# Patient Record
Sex: MALE | Employment: FULL TIME | URBAN - METROPOLITAN AREA
[De-identification: names, ages, dates, MRNs, and addresses within clinical notes are randomized per-mention and may not be internally consistent; named-entity substitution may affect disease eponyms.]

---

## 2017-03-01 ENCOUNTER — OFFICE VISIT (OUTPATIENT)
Dept: FAMILY MEDICINE | Facility: CLINIC | Age: 22
End: 2017-03-01
Payer: COMMERCIAL

## 2017-03-01 VITALS
HEIGHT: 69 IN | TEMPERATURE: 97.7 F | DIASTOLIC BLOOD PRESSURE: 79 MMHG | HEART RATE: 70 BPM | BODY MASS INDEX: 34.66 KG/M2 | WEIGHT: 234 LBS | SYSTOLIC BLOOD PRESSURE: 127 MMHG

## 2017-03-01 DIAGNOSIS — F17.200 TOBACCO USE DISORDER: Primary | ICD-10-CM

## 2017-03-01 PROCEDURE — 99213 OFFICE O/P EST LOW 20 MIN: CPT | Performed by: FAMILY MEDICINE

## 2017-03-01 NOTE — NURSING NOTE
"Chief Complaint   Patient presents with     Patient Request     quit smoking discusssion        Initial /79 (BP Location: Right arm, Patient Position: Chair, Cuff Size: Adult Large)  Pulse 70  Temp 97.7  F (36.5  C) (Oral)  Ht 5' 8.5\" (1.74 m)  Wt 234 lb (106.1 kg)  BMI 35.06 kg/m2 Estimated body mass index is 35.06 kg/(m^2) as calculated from the following:    Height as of this encounter: 5' 8.5\" (1.74 m).    Weight as of this encounter: 234 lb (106.1 kg).  Medication Reconciliation: complete  Neyda Simms MA    "

## 2017-03-01 NOTE — MR AVS SNAPSHOT
After Visit Summary   3/1/2017    Juan Lay    MRN: [id number]           Patient Information     Date Of Birth          1995        Visit Information        Provider Department      3/1/2017 8:00 AM Tatyana Valdovinos MD [facility]        Today's Diagnoses     Tobacco use disorder    -  1       Follow-ups after your visit        Additional Services     CALL IT QUITS (QUITPLAN) REFERRAL       [place] TOBACCO QUITLINES FAX FORM  Fax form to: [phone]    The clinic will facilitate the referral to the quitline.    Provider Information:  ===============================================================  Tatyana Valdovinos MD  ID#: [id number] - FMG: [facility] [phone] Fax: [phone]   [email or web address]  Payor: SELECTCARE / Plan: AETNA SELECTCARE / Product Type: HMO /   ===============================================================    The Public Health Service Guideline does not recommend providing over-the-counter nicotine replacement therapy products without physician authorization to patients with the following conditions: pregnancy, uncontrolled high blood pressure, or cardiovascular diseases.     I authorize the [place] Tobacco Quitlines to provide over-the-counter nicotine replacement products for the patient listed below if the patient's health plan benefits cover NRT or if the patient is eligible for QUITPLAN services.    Patient Consented to:  ===============================================================  - YES - I am ready to quit tobacco and request the above information be given to the quitline so they may contact me.  I understand that one of [place] s Tobacco Quitlines will inform my provider about my participation.  ===============================================================  Please check the BEST 3-hour call window for them to reach you: 2pm -  "5pm  May we leave a message?  YES  Language Preference:  English  Phone Number: Home Phone      [phone]  Mobile          Not on file.     E-mail Address: No e-mail address on record    ========================================================================  FOR QUITLINE USE ONLY:  THIS INFORMATION WILL BE PROVIDED BACK TO THE PROVIDER  Contact date: __/ __/__ or ____ Did not reach after three attempts.    Outcome:  __ Enrolled in telephone counseling program  __ Declined  __ Not Reached    Stage of readiness: _______________________  Planned Quit Date: ___/ ___/ ___  Comments:      2011 [facility]   This message funded by [employer] [place], an independent licensee of the Blue Cross and [employer] Association. Rev. 11/1/12                  Who to contact     If you have questions or need follow up information about today's clinic visit or your schedule please contact [facility] directly at [phone].  Normal or non-critical lab and imaging results will be communicated to you by MyChart, letter or phone within 4 business days after the clinic has received the results. If you do not hear from us within 7 days, please contact the clinic through MyChart or phone. If you have a critical or abnormal lab result, we will notify you by phone as soon as possible.  Submit refill requests through [employer] or call your pharmacy and they will forward the refill request to us. Please allow 3 business days for your refill to be completed.          Additional Information About Your Visit        [employer] Information     [employer] lets you send messages to your doctor, view your test results, renew your prescriptions, schedule appointments and more. To sign up, go to [email or web address] . Click on \"Log in\" on the left side of the screen, which will take you to the Welcome page. Then click on \"Sign up Now\" on the right side of the page.     You will be asked to enter " "the access code listed below, as well as some personal information. Please follow the directions to create your username and password.     Your access code is: [email or web address]  Expires: 2017  8:45 AM     Your access code will  in 90 days. If you need help or a new code, please call your [facility] or [phone].        Care EveryWhere ID     This is your Care EveryWhere ID. This could be used by other organizations to access your [place] medical records  [id number]        Your Vitals Were     Pulse Temperature Height BMI (Body Mass Index)          70 97.7  F (36.5  C) (Oral) 5' 8.5\" (1.74 m) 35.06 kg/m2         Blood Pressure from Last 3 Encounters:   17 127/79   11/24/15 144/86   14 118/70    Weight from Last 3 Encounters:   17 234 lb (106.1 kg)   11/24/15 222 lb (100.7 kg) (97 %)*   14 212 lb (96.2 kg) (96 %)*     * Growth percentiles are based on [facility] 2-20 Years data.              We Performed the Following     CALL IT QUITS (QUITPLAN) REFERRAL          Today's Medication Changes          These changes are accurate as of: 3/1/17  8:45 AM.  If you have any questions, ask your nurse or doctor.               Start taking these medicines.        Dose/Directions    nicotine polacrilex 2 MG gum   Commonly known as:  [employer] NICOTINE POLACRILEX   Used for:  Tobacco use disorder   Started by:  Tatyana Valdovinos MD        Dose:  2 mg   Place 1 each (2 mg) inside cheek as needed for smoking cessation   Quantity:  30 tablet   Refills:  3            Where to get your medicines      These medications were sent to [facility] [address] [place], [place] - [address] AT [place] [address] [facility] [address]     Phone:  [phone]     nicotine polacrilex 2 MG gum                Primary Care Provider Office Phone #    [facility] [phone]       [address] [facility] [address]        Thank you!     Thank " you for choosing [facility]  for your care. Our goal is always to provide you with excellent care. Hearing back from our patients is one way we can continue to improve our services. Please take a few minutes to complete the written survey that you may receive in the mail after your visit with us. Thank you!             Your Updated Medication List - Protect others around you: Learn how to safely use, store and throw away your medicines at www.disposemymeds.org.          This list is accurate as of: 3/1/17  8:45 AM.  Always use your most recent med list.                   Brand Name Dispense Instructions for use    MELATONIN PO      As needed       nicotine polacrilex 2 MG gum    CVS NICOTINE POLACRILEX    30 tablet    Place 1 each (2 mg) inside cheek as needed for smoking cessation

## 2017-03-01 NOTE — PROGRESS NOTES
"  SUBJECTIVE:                                                    Juan Lay is a 21 year old male who presents to clinic today for the following health issues:    Wants to quit smoking -discuss     Lately he wants to quit smoking.   He has been smoking for last 2 and 2 and half yrs. He smokes 1/2 ppd.     He had tried patches for quit smoking. He had nausea with that.     He is looking for nicotine gum for smoking cessation.   He plans to quit by April 1st.       Problem list and histories reviewed & adjusted, as indicated.  Additional history: as documented    Reviewed and updated as needed this visit by clinical staff  Tobacco  Allergies  Meds  Med Hx  Surg Hx  Fam Hx  Soc Hx      Reviewed and updated as needed this visit by Provider       ROS:  Constitutional, HEENT, cardiovascular, pulmonary, gi and gu systems are negative, except as otherwise noted.    OBJECTIVE:                                                    /79 (BP Location: Right arm, Patient Position: Chair, Cuff Size: Adult Large)  Pulse 70  Temp 97.7  F (36.5  C) (Oral)  Ht 5' 8.5\" (1.74 m)  Wt 234 lb (106.1 kg)  BMI 35.06 kg/m2  Body mass index is 35.06 kg/(m^2).  GENERAL: healthy, alert and no distress  NECK: no adenopathy, no asymmetry, masses, or scars and thyroid normal to palpation  RESP: lungs clear to auscultation - no rales, rhonchi or wheezes  CV: regular rate and rhythm    Diagnostic Test Results:  none      ASSESSMENT/PLAN:                                                          ICD-10-CM    1. Tobacco use disorder F17.200 nicotine polacrilex (CVS NICOTINE POLACRILEX) 2 MG gum     This pt is new to me. PMH significant for mental health disorders.   Pt is not on any medications, not seeing psychiatrist at this point.   He has few symptoms of depression, denies suicidal or homicidal ideation or thoughts.   Encouraged to schedule appointment for establish care and discuss depression.     Tatyana Valdovinos, " MD  [facility]

## 2017-11-25 ENCOUNTER — TRANSFERRED RECORDS (OUTPATIENT)
Dept: HEALTH INFORMATION MANAGEMENT | Facility: CLINIC | Age: 22
End: 2017-11-25

## 2017-11-30 ENCOUNTER — ALLIED HEALTH/NURSE VISIT (OUTPATIENT)
Dept: NURSING | Facility: CLINIC | Age: 22
End: 2017-11-30
Payer: COMMERCIAL

## 2017-11-30 DIAGNOSIS — Z48.02 ENCOUNTER FOR REMOVAL OF SUTURES: Primary | ICD-10-CM

## 2017-11-30 PROCEDURE — 99207 ZZC NO CHARGE NURSE ONLY: CPT
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
## 2017-11-30 NOTE — MR AVS SNAPSHOT
"              After Visit Summary   11/30/2017    Juan Lay    MRN: [id number]           Patient Information     Date Of Birth          1995        Visit Information        Provider Department      11/30/2017 2:45 PM FZ RN [facility]         Follow-ups after your visit        Your next 10 appointments already scheduled     Dec 05, 2017  3:00 PM CST   Nurse Only with CP RN   [facility]    [address] [facility] [address] [phone]           [facility] need to be scheduled for 60 mins *Prolia injections with the RN*              Who to contact     If you have questions or need follow up information about today's clinic visit or your schedule please contact [facility] directly at [phone].  Normal or non-critical lab and imaging results will be communicated to you by Q1Mediahart, letter or phone within 4 business days after the clinic has received the results. If you do not hear from us within 7 days, please contact the clinic through MyChart or phone. If you have a critical or abnormal lab result, we will notify you by phone as soon as possible.  Submit refill requests through [employer] or call your pharmacy and they will forward the refill request to us. Please allow 3 business days for your refill to be completed.          Additional Information About Your Visit        MyChart Information     [employer] lets you send messages to your doctor, view your test results, renew your prescriptions, schedule appointments and more. To sign up, go to [email or web address] . Click on \"Log in\" on the left side of the screen, which will take you to the Welcome page. Then click on \"Sign up Now\" on the right side of the page.     You will be asked to enter the access code listed below, as well as some personal information. Please follow the directions to create your username and password.   "   Your access code is: [email or web address]  Expires: 2018  2:46 PM     Your access code will  in 90 days. If you need help or a new code, please call your [facility] or [phone].        Care EveryWhere ID     This is your Care EveryWhere ID. This could be used by other organizations to access your [place] medical records  [id number]         Blood Pressure from Last 3 Encounters:   17 127/79   11/24/15 144/86   14 118/70    Weight from Last 3 Encounters:   17 234 lb (106.1 kg)   11/24/15 222 lb (100.7 kg) (97 %)*   14 212 lb (96.2 kg) (96 %)*     * Growth percentiles are based on [facility] 2-20 Years data.              Today, you had the following     No orders found for display       Primary Care Provider Office Phone # Fax #    [facility] [phone]       [address] [place] [address]        Equal Access to Services     [facility]: Hadii lucy hudson hadasho Sokamila, waaxda luqadaha, qaybta kaalmada adeegyabob, catherine verdin . So [facility] [phone].    ATENCIÓN: Si habla español, tiene a lyles disposición servicios gratuitos de asistencia lingüística. Llame al [phone].    We comply with applicable federal civil rights laws and [place] laws. We do not discriminate on the basis of race, color, national origin, age, disability, sex, sexual orientation, or gender identity.            Thank you!     Thank you for choosing [facility]  for your care. Our goal is always to provide you with excellent care. Hearing back from our patients is one way we can continue to improve our services. Please take a few minutes to complete the written survey that you may receive in the mail after your visit with us. Thank you!             Your Updated Medication List - Protect others around you: Learn how to safely use, store and throw away your medicines at www.disposemymeds.org.          This list is  accurate as of: 11/30/17  2:46 PM.  Always use your most recent med list.                   Brand Name Dispense Instructions for use Diagnosis    MELATONIN PO      As needed        nicotine polacrilex 2 MG gum    CVS NICOTINE POLACRILEX    30 tablet    Place 1 each (2 mg) inside cheek as needed for smoking cessation    Tobacco use disorder

## 2018-10-16 ENCOUNTER — OFFICE VISIT (OUTPATIENT)
Dept: FAMILY MEDICINE | Facility: CLINIC | Age: 23
End: 2018-10-16
Payer: COMMERCIAL

## 2018-10-16 VITALS
OXYGEN SATURATION: 98 % | DIASTOLIC BLOOD PRESSURE: 85 MMHG | BODY MASS INDEX: 36.11 KG/M2 | HEART RATE: 81 BPM | SYSTOLIC BLOOD PRESSURE: 126 MMHG | WEIGHT: 241 LBS | TEMPERATURE: 99.3 F

## 2018-10-16 DIAGNOSIS — Z71.6 ENCOUNTER FOR SMOKING CESSATION COUNSELING: Primary | ICD-10-CM

## 2018-10-16 PROCEDURE — 99213 OFFICE O/P EST LOW 20 MIN: CPT | Performed by: FAMILY MEDICINE

## 2018-10-16 ASSESSMENT — PAIN SCALES - GENERAL: PAINLEVEL: NO PAIN (0)

## 2018-10-16 NOTE — PATIENT INSTRUCTIONS
Nicotine lozenge  Brand Names: Commit, NICOrelief, Nicorette  What is this medicine?  NICOTINE (BRAEDEN oh teen) helps people stop smoking. The lozenges replace the nicotine found in cigarettes and help to decrease withdrawal effects. It is most effective when used in combination with a stop-smoking program.  How should I use this medicine?  Place the lozenge in the mouth. Suck on the lozenge until it is completely dissolved. Do not swallow the lozenge. Follow the directions carefully that come with the lozenge. Use exactly as directed. Do not use the lozenges more often than directed.  Talk to your pediatrician regarding the use of this medicine in children. Special care may be needed.  What side effects may I notice from receiving this medicine?  Side effects that you should report to your doctor or health care professional as soon as possible:    allergic reactions like skin rash, itching or hives, swelling of the face, lips, or tongue    breathing problems    changes in hearing    changes in vision    chest pain    cold sweats    confusion    fast, irregular heartbeat    feeling faint or lightheaded, falls    headache    increased saliva    nausea, vomiting    stomach pain    weakness  Side effects that usually do not require medical attention (report to your doctor or health care professional if they continue or are bothersome):    diarrhea    dry mouth    hiccups    irritability    nervousness or restlessness    trouble sleeping or vivid dreams  What may interact with this medicine?    medicines for asthma    medicines for blood pressure    medicines for mental depression    What if I miss a dose?  This does not apply.  Where should I keep my medicine?  Keep out of the reach of children.  Store at room temperature between 15 and 30 degrees C (59 and 86 degrees F). Protect from heat and light. Throw away unused medicine after the expiration date.  What should I tell my health care provider before I take this  medicine?  They need to know if you have any of these conditions:    diabetes    heart disease, angina, irregular heartbeat or previous heart attack    high blood pressure    lung disease, including asthma    overactive thyroid    pheochromocytoma    seizures or history of seizures    stomach problems or ulcers    an unusual or allergic reaction to nicotine, other medicines, foods, dyes, or preservatives    pregnant or trying to get pregnant    breast-feeding  What should I watch for while using this medicine?  Always carry the nicotine lozenges with you. You should begin using the nicotine lozenges the day you stop smoking. It is okay if you do not succeed with your attempt to quit and have a cigarette. You can still continue your quit attempt and keep using the product as directed. Just throw away your cigarettes and get back to your quit plan.  If you are a diabetic and you quit smoking, the effects of insulin may be increased and you may need to reduce your insulin dose. Check with your doctor or health care professional about how you should adjust your insulin dose.  Brush your teeth regularly to reduce mouth irritation.  NOTE:This sheet is a summary. It may not cover all possible information. If you have questions about this medicine, talk to your doctor, pharmacist, or health care provider. Copyright  2018 [employer]        The Benefits of Living Smoke Free  What do you want to gain from quitting? Check off some reasons to quit.  Health benefits  ___  Improve my ability to breathe without coughing or shortness of breath  ___  Reduce my risk of lung cancer, heart disease, chronic lung disease  ___  Have fewer wrinkles and softer skin  ___  Improve my sense of taste and smell  ___  For pregnant women--reduce the risk of having a miscarriage, stillbirth, premature birth, or low-birth-weight baby  Personal benefits  ___  Feel more in control of my life  ___  Have better-smelling hair, breath, clothes, home, and  car  ___  Save time by not having to take smoke breaks, buy cigarettes, or hunt for a light  ___  Have whiter teeth  Family benefits  ___  Reduce my children s respiratory tract infections  ___  Set a good example for my children  ___  Reduce my family s cancer risk  Financial benefits  ___  Save hundreds of dollars each year that would be spent on cigarettes  ___  Save money on medical bills  ___  Save on life, health, and car insurance premiums     Those dollars add up!  Cigarettes are expensive, and getting more expensive all the time. Do you realize how much money you are spending on cigarettes per year? What is the average amount you spend on a pack of cigarettes? What is the average number of packs that you smoke per day? Using your answers to these questions, fill in this formula to help you find out:  ($ _____ per pack) ×  ( _____ number of packs per day) × (365 days) =  $ _____ yearly cost of smoking  Besides tobacco, there are other costs, including extra cleaning bills and replacement costs for clothing and furniture; medical expenses for smoking-related illnesses; and higher health, life, and car insurance premiums.  Cigars and pipes count too!  Cigars and pipes are also dangerous. So are smokeless (chewing) tobacco and snuff. All of these products contain nicotine, a highly addictive substance that has harmful effects on your body. Quitting smoking means giving up all tobacco products.      For more information    https://smokefree.gov/tmiw-va-ui-expert    National Cancer [place] Smoking Quitline: 877-44U-QUIT (323-294-3378)   Date Last Reviewed: 2/1/2017 2000-2017 [employer]. [address] [place] [address], [place] [address]. All rights reserved. This information is not intended as a substitute for professional medical care. Always follow your healthcare professional's instructions.

## 2018-10-16 NOTE — PROGRESS NOTES
SUBJECTIVE:                                                    Juan Lay is a 23 year old male who presents to clinic today for the following health issues:      Patient is here for nicotine lozenges.  Patient comes in today he does need to get  nicotine lozenges.  He would like to quit smoking.  He reports currently, he is not smoking that much.  He reports he has been a smoker for about 3-4 years.    No analysis smoking in the house.  He denies chest pain or shortness of breath.  He tried Nicotine patches, did not work for him    Problem list and histories reviewed & adjusted, as indicated.  Additional history: as documented    Patient Active Problem List   Diagnosis     Suicidal ideation     Posttraumatic stress disorder     Depressive disorder, not elsewhere classified     Cannabis dependence (H)     Alcohol abuse     Opioid abuse (H)     Hallucinogen abuse (H)     Undiagnosed cardiac murmurs     Bicuspid aortic valve     Need for SBE (subacute bacterial endocarditis) prophylaxis     Skin rash     Patellar dislocation     Chondral loose body of knee joint     Chondral lesion     Moderate recurrent major depression (H)     Anxiety     Past Surgical History:   Procedure Laterality Date     ARTHROSCOPY KNEE  4/5/13    microfracture with loose body removal     EXTRACTION(S) DENTAL       KNEE SURGERY         Social History   Substance Use Topics     Smoking status: Current Every Day Smoker     Packs/day: 0.50     Types: Cigarettes     Start date: 1/1/2015     Smokeless tobacco: Never Used     Alcohol use No     Family History   Problem Relation Age of Onset     Substance Abuse Paternal Uncle      Substance Abuse Paternal Uncle      Substance Abuse Paternal Aunt      Depression Maternal Grandmother      Substance Abuse Maternal Grandfather      Depression Maternal Grandfather      Asthma Brother      Cardiovascular Maternal Aunt 39     heart surgery in [facility] to be done 2011            ROS:  Constitutional, HEENT, cardiovascular, pulmonary, gi and gu systems are negative, except as otherwise noted.    OBJECTIVE:     /85 (BP Location: Right arm, Patient Position: Chair, Cuff Size: Adult Large)  Pulse 81  Temp 99.3  F (37.4  C) (Oral)  Wt 241 lb (109.3 kg)  SpO2 98%  BMI 36.11 kg/m2  Body mass index is 36.11 kg/(m^2).  GENERAL: healthy, alert and no distress  PSYCH: mentation appears normal, affect normal/bright    none     ASSESSMENT/PLAN:       ICD-10-CM    1. Encounter for smoking cessation counseling Z71.6 nicotine polacrilex (NICOTINE MINI) 4 MG lozenge       See Patient Instructions  Patient Instructions       Nicotine lozenge  Brand Names: Commit, NICOrelief, Nicorette  What is this medicine?  NICOTINE (BRAEDEN oh teen) helps people stop smoking. The lozenges replace the nicotine found in cigarettes and help to decrease withdrawal effects. It is most effective when used in combination with a stop-smoking program.  How should I use this medicine?  Place the lozenge in the mouth. Suck on the lozenge until it is completely dissolved. Do not swallow the lozenge. Follow the directions carefully that come with the lozenge. Use exactly as directed. Do not use the lozenges more often than directed.  Talk to your pediatrician regarding the use of this medicine in children. Special care may be needed.  What side effects may I notice from receiving this medicine?  Side effects that you should report to your doctor or health care professional as soon as possible:    allergic reactions like skin rash, itching or hives, swelling of the face, lips, or tongue    breathing problems    changes in hearing    changes in vision    chest pain    cold sweats    confusion    fast, irregular heartbeat    feeling faint or lightheaded, falls    headache    increased saliva    nausea, vomiting    stomach pain    weakness  Side effects that usually do not require medical attention (report to your doctor or  health care professional if they continue or are bothersome):    diarrhea    dry mouth    hiccups    irritability    nervousness or restlessness    trouble sleeping or vivid dreams  What may interact with this medicine?    medicines for asthma    medicines for blood pressure    medicines for mental depression    What if I miss a dose?  This does not apply.  Where should I keep my medicine?  Keep out of the reach of children.  Store at room temperature between 15 and 30 degrees C (59 and 86 degrees F). Protect from heat and light. Throw away unused medicine after the expiration date.  What should I tell my health care provider before I take this medicine?  They need to know if you have any of these conditions:    diabetes    heart disease, angina, irregular heartbeat or previous heart attack    high blood pressure    lung disease, including asthma    overactive thyroid    pheochromocytoma    seizures or history of seizures    stomach problems or ulcers    an unusual or allergic reaction to nicotine, other medicines, foods, dyes, or preservatives    pregnant or trying to get pregnant    breast-feeding  What should I watch for while using this medicine?  Always carry the nicotine lozenges with you. You should begin using the nicotine lozenges the day you stop smoking. It is okay if you do not succeed with your attempt to quit and have a cigarette. You can still continue your quit attempt and keep using the product as directed. Just throw away your cigarettes and get back to your quit plan.  If you are a diabetic and you quit smoking, the effects of insulin may be increased and you may need to reduce your insulin dose. Check with your doctor or health care professional about how you should adjust your insulin dose.  Brush your teeth regularly to reduce mouth irritation.  NOTE:This sheet is a summary. It may not cover all possible information. If you have questions about this medicine, talk to your doctor, pharmacist, or  health care provider. Copyright  2018 [employer]        The Benefits of Living Smoke Free  What do you want to gain from quitting? Check off some reasons to quit.  Health benefits  ___  Improve my ability to breathe without coughing or shortness of breath  ___  Reduce my risk of lung cancer, heart disease, chronic lung disease  ___  Have fewer wrinkles and softer skin  ___  Improve my sense of taste and smell  ___  For pregnant women--reduce the risk of having a miscarriage, stillbirth, premature birth, or low-birth-weight baby  Personal benefits  ___  Feel more in control of my life  ___  Have better-smelling hair, breath, clothes, home, and car  ___  Save time by not having to take smoke breaks, buy cigarettes, or hunt for a light  ___  Have whiter teeth  Family benefits  ___  Reduce my children s respiratory tract infections  ___  Set a good example for my children  ___  Reduce my family s cancer risk  Financial benefits  ___  Save hundreds of dollars each year that would be spent on cigarettes  ___  Save money on medical bills  ___  Save on life, health, and car insurance premiums     Those dollars add up!  Cigarettes are expensive, and getting more expensive all the time. Do you realize how much money you are spending on cigarettes per year? What is the average amount you spend on a pack of cigarettes? What is the average number of packs that you smoke per day? Using your answers to these questions, fill in this formula to help you find out:  ($ _____ per pack) ×  ( _____ number of packs per day) × (365 days) =  $ _____ yearly cost of smoking  Besides tobacco, there are other costs, including extra cleaning bills and replacement costs for clothing and furniture; medical expenses for smoking-related illnesses; and higher health, life, and car insurance premiums.  Cigars and pipes count too!  Cigars and pipes are also dangerous. So are smokeless (chewing) tobacco and snuff. All of these products contain nicotine, a  highly addictive substance that has harmful effects on your body. Quitting smoking means giving up all tobacco products.      For more information    https://smokefree.gov/vvsx-ts-ov-expert    National Cancer [place] Smoking Quitline: 877-44U-QUIT (191-704-4634)   Date Last Reviewed: 2/1/2017 2000-2017 [employer]. [address] [place] [address]. All rights reserved. This information is not intended as a substitute for professional medical care. Always follow your healthcare professional's instructions.            Bryant Cardenas MD  [facility]

## 2018-10-16 NOTE — MR AVS SNAPSHOT
After Visit Summary   10/16/2018    Juan Lay    MRN: [id number]           Patient Information     Date Of Birth          1995        Visit Information        Provider Department      10/16/2018 1:20 PM Bryant Cardenas MD [facility]        Today's Diagnoses     Encounter for smoking cessation counseling    -  1      Care Instructions      Nicotine lozenge  Brand Names: Commit, NICOrelief, Nicorette  What is this medicine?  NICOTINE (BRAEDEN oh teen) helps people stop smoking. The lozenges replace the nicotine found in cigarettes and help to decrease withdrawal effects. It is most effective when used in combination with a stop-smoking program.  How should I use this medicine?  Place the lozenge in the mouth. Suck on the lozenge until it is completely dissolved. Do not swallow the lozenge. Follow the directions carefully that come with the lozenge. Use exactly as directed. Do not use the lozenges more often than directed.  Talk to your pediatrician regarding the use of this medicine in children. Special care may be needed.  What side effects may I notice from receiving this medicine?  Side effects that you should report to your doctor or health care professional as soon as possible:    allergic reactions like skin rash, itching or hives, swelling of the face, lips, or tongue    breathing problems    changes in hearing    changes in vision    chest pain    cold sweats    confusion    fast, irregular heartbeat    feeling faint or lightheaded, falls    headache    increased saliva    nausea, vomiting    stomach pain    weakness  Side effects that usually do not require medical attention (report to your doctor or health care professional if they continue or are bothersome):    diarrhea    dry mouth    hiccups    irritability    nervousness or restlessness    trouble sleeping or vivid dreams  What may interact with this medicine?    medicines for asthma    medicines for blood  pressure    medicines for mental depression    What if I miss a dose?  This does not apply.  Where should I keep my medicine?  Keep out of the reach of children.  Store at room temperature between 15 and 30 degrees C (59 and 86 degrees F). Protect from heat and light. Throw away unused medicine after the expiration date.  What should I tell my health care provider before I take this medicine?  They need to know if you have any of these conditions:    diabetes    heart disease, angina, irregular heartbeat or previous heart attack    high blood pressure    lung disease, including asthma    overactive thyroid    pheochromocytoma    seizures or history of seizures    stomach problems or ulcers    an unusual or allergic reaction to nicotine, other medicines, foods, dyes, or preservatives    pregnant or trying to get pregnant    breast-feeding  What should I watch for while using this medicine?  Always carry the nicotine lozenges with you. You should begin using the nicotine lozenges the day you stop smoking. It is okay if you do not succeed with your attempt to quit and have a cigarette. You can still continue your quit attempt and keep using the product as directed. Just throw away your cigarettes and get back to your quit plan.  If you are a diabetic and you quit smoking, the effects of insulin may be increased and you may need to reduce your insulin dose. Check with your doctor or health care professional about how you should adjust your insulin dose.  Brush your teeth regularly to reduce mouth irritation.  NOTE:This sheet is a summary. It may not cover all possible information. If you have questions about this medicine, talk to your doctor, pharmacist, or health care provider. Copyright  2018 [employer]        The Benefits of Living Smoke Free  What do you want to gain from quitting? Check off some reasons to quit.  Health benefits  ___  Improve my ability to breathe without coughing or shortness of breath  ___  Reduce  my risk of lung cancer, heart disease, chronic lung disease  ___  Have fewer wrinkles and softer skin  ___  Improve my sense of taste and smell  ___  For pregnant women--reduce the risk of having a miscarriage, stillbirth, premature birth, or low-birth-weight baby  Personal benefits  ___  Feel more in control of my life  ___  Have better-smelling hair, breath, clothes, home, and car  ___  Save time by not having to take smoke breaks, buy cigarettes, or hunt for a light  ___  Have whiter teeth  Family benefits  ___  Reduce my children s respiratory tract infections  ___  Set a good example for my children  ___  Reduce my family s cancer risk  Financial benefits  ___  Save hundreds of dollars each year that would be spent on cigarettes  ___  Save money on medical bills  ___  Save on life, health, and car insurance premiums     Those dollars add up!  Cigarettes are expensive, and getting more expensive all the time. Do you realize how much money you are spending on cigarettes per year? What is the average amount you spend on a pack of cigarettes? What is the average number of packs that you smoke per day? Using your answers to these questions, fill in this formula to help you find out:  ($ _____ per pack) ×  ( _____ number of packs per day) × (365 days) =  $ _____ yearly cost of smoking  Besides tobacco, there are other costs, including extra cleaning bills and replacement costs for clothing and furniture; medical expenses for smoking-related illnesses; and higher health, life, and car insurance premiums.  Cigars and pipes count too!  Cigars and pipes are also dangerous. So are smokeless (chewing) tobacco and snuff. All of these products contain nicotine, a highly addictive substance that has harmful effects on your body. Quitting smoking means giving up all tobacco products.      For more information    https://smokefree.gov/vpei-xx-mi-expert    National Cancer [place] Smoking Quitline: 877-44U-QUIT (017-055-8203)    Date Last Reviewed: 2/1/2017 2000-2017 [employer]. [address] [place] [address], [place] [address]. All rights reserved. This information is not intended as a substitute for professional medical care. Always follow your healthcare professional's instructions.                Follow-ups after your visit        Who to contact     If you have questions or need follow up information about today's clinic visit or your schedule please contact [facility] directly at [phone].  Normal or non-critical lab and imaging results will be communicated to you by MyChart, letter or phone within 4 business days after the clinic has received the results. If you do not hear from us within 7 days, please contact the clinic through MyChart or phone. If you have a critical or abnormal lab result, we will notify you by phone as soon as possible.  Submit refill requests through [employer] or call your pharmacy and they will forward the refill request to us. Please allow 3 business days for your refill to be completed.          Additional Information About Your Visit        Care EveryWhere ID     This is your Care EveryWhere ID. This could be used by other organizations to access your [place] medical records  [id number]        Your Vitals Were     Pulse Temperature Pulse Oximetry BMI (Body Mass Index)          81 99.3  F (37.4  C) (Oral) 98% 36.11 kg/m2         Blood Pressure from Last 3 Encounters:   10/16/18 126/85   03/01/17 127/79   11/24/15 144/86    Weight from Last 3 Encounters:   10/16/18 241 lb (109.3 kg)   03/01/17 234 lb (106.1 kg)   11/24/15 222 lb (100.7 kg) (97 %)*     * Growth percentiles are based on CDC 2-20 Years data.              Today, you had the following     No orders found for display         Today's Medication Changes          These changes are accurate as of 10/16/18  1:48 PM.  If you have any questions, ask your nurse or doctor.               Start taking these medicines.         Dose/Directions    nicotine polacrilex 4 MG lozenge   Commonly known as:  NICOTINE MINI   Used for:  Encounter for smoking cessation counseling   Started by:  Bryant Cardenas MD        Dose:  4 mg   Place 1 lozenge (4 mg) inside cheek as needed for smoking cessation   Quantity:  360 lozenge   Refills:  6            Where to get your medicines      These medications were sent to [facility] - [place], [place] - [address]  [address], [facility] [address]     Phone:  [phone]     nicotine polacrilex 4 MG lozenge                Primary Care Provider Office Phone # Fax #    [facility] [phone]       [address] [facility] [address]        Equal Access to Services     AMY PAREDES : Hadii lucy hudson hadasho Soomaali, waaxda luqadaha, qaybta kaalmada adeegyada, waxay danain terry galvez. So [facility] [phone].    ATENCIÓN: Si habla español, tiene a lyles disposición servicios gratuitos de asistencia lingüística. Llame al [phone].    We comply with applicable federal civil rights laws and [place] laws. We do not discriminate on the basis of race, color, national origin, age, disability, sex, sexual orientation, or gender identity.            Thank you!     Thank you for choosing [facility]  for your care. Our goal is always to provide you with excellent care. Hearing back from our patients is one way we can continue to improve our services. Please take a few minutes to complete the written survey that you may receive in the mail after your visit with us. Thank you!             Your Updated Medication List - Protect others around you: Learn how to safely use, store and throw away your medicines at www.disposemymeds.org.          This list is accurate as of 10/16/18  1:48 PM.  Always use your most recent med list.                   Brand Name Dispense Instructions for  use Diagnosis    MELATONIN PO      As needed        nicotine polacrilex 4 MG lozenge    NICOTINE MINI    360 lozenge    Place 1 lozenge (4 mg) inside cheek as needed for smoking cessation    Encounter for smoking cessation counseling

## 2018-12-19 ENCOUNTER — ALLIED HEALTH/NURSE VISIT (OUTPATIENT)
Dept: NURSING | Facility: CLINIC | Age: 23
End: 2018-12-19
Payer: COMMERCIAL

## 2018-12-19 DIAGNOSIS — Z23 NEED FOR PROPHYLACTIC VACCINATION AND INOCULATION AGAINST INFLUENZA: ICD-10-CM

## 2018-12-19 DIAGNOSIS — Z00.00 PREVENTATIVE HEALTH CARE: Primary | ICD-10-CM

## 2018-12-19 PROCEDURE — 90686 IIV4 VACC NO PRSV 0.5 ML IM: CPT

## 2018-12-19 PROCEDURE — 90472 IMMUNIZATION ADMIN EACH ADD: CPT

## 2018-12-19 PROCEDURE — 99207 ZZC NO CHARGE NURSE ONLY: CPT

## 2018-12-19 PROCEDURE — 90471 IMMUNIZATION ADMIN: CPT

## 2018-12-19 PROCEDURE — 90651 9VHPV VACCINE 2/3 DOSE IM: CPT

## 2018-12-19 NOTE — NURSING NOTE
Prior to injection, verified patient identity using patient's name and date of birth.  Due to injection administration, patient instructed to remain in clinic for 15 minutes  afterwards, and to report any adverse reaction to me immediately.      Screening Questionnaire for Adult Immunization    Are you sick today?   No   Do you have allergies to medications, food, a vaccine component or latex?   No   Have you ever had a serious reaction after receiving a vaccination?   No   Do you have a long-term health problem with heart disease, lung disease, asthma, kidney disease, metabolic disease (e.g. diabetes), anemia, or other blood disorder?   No   Do you have cancer, leukemia, HIV/AIDS, or any other immune system problem?   No   In the past 3 months, have you taken medications that affect  your immune system, such as prednisone, other steroids, or anticancer drugs; drugs for the treatment of rheumatoid arthritis, Crohn s disease, or psoriasis; or have you had radiation treatments?   No   Have you had a seizure, or a brain or other nervous system problem?   No   During the past year, have you received a transfusion of blood or blood     products, or been given immune (gamma) globulin or antiviral drug?   No   For women: Are you pregnant or is there a chance you could become        pregnant during the next month?   No   Have you received any vaccinations in the past 4 weeks?   No     Immunization questionnaire answers were all negative.        Per orders of Dr. Cardenas, injection of Gardasil 9 given by Shama Johnson. Patient instructed to remain in clinic for 15 minutes afterwards, and to report any adverse reaction to me immediately.       Screening performed by Shama Johnson on 12/19/2018 at 10:41 AM.

## 2019-02-06 ENCOUNTER — OFFICE VISIT (OUTPATIENT)
Dept: FAMILY MEDICINE | Facility: CLINIC | Age: 24
End: 2019-02-06
Payer: COMMERCIAL

## 2019-02-06 VITALS
SYSTOLIC BLOOD PRESSURE: 126 MMHG | TEMPERATURE: 98.4 F | HEIGHT: 69 IN | BODY MASS INDEX: 35.16 KG/M2 | WEIGHT: 237.4 LBS | HEART RATE: 88 BPM | OXYGEN SATURATION: 98 % | DIASTOLIC BLOOD PRESSURE: 86 MMHG

## 2019-02-06 DIAGNOSIS — Z71.6 ENCOUNTER FOR SMOKING CESSATION COUNSELING: ICD-10-CM

## 2019-02-06 DIAGNOSIS — Z00.01 ENCOUNTER FOR ROUTINE ADULT HEALTH EXAMINATION WITH ABNORMAL FINDINGS: Primary | ICD-10-CM

## 2019-02-06 DIAGNOSIS — Q23.81 BICUSPID AORTIC VALVE: ICD-10-CM

## 2019-02-06 DIAGNOSIS — Z13.220 SCREENING CHOLESTEROL LEVEL: ICD-10-CM

## 2019-02-06 PROCEDURE — 99395 PREV VISIT EST AGE 18-39: CPT | Performed by: PHYSICIAN ASSISTANT

## 2019-02-06 RX ORDER — VARENICLINE TARTRATE 1 MG/1
1 TABLET, FILM COATED ORAL 2 TIMES DAILY
Qty: 180 TABLET | Refills: 3 | Status: SHIPPED | OUTPATIENT
Start: 2019-02-06 | End: 2020-02-06

## 2019-02-06 ASSESSMENT — MIFFLIN-ST. JEOR: SCORE: 2065.6

## 2019-02-06 NOTE — PROGRESS NOTES
SUBJECTIVE:   CC: Juan Lay is an 23 year old male who presents for preventive health visit.     Healthy Habits:    Do you get at least three servings of calcium containing foods daily (dairy, green leafy vegetables, etc.)? no    Amount of exercise or daily activities, outside of work: none    Problems taking medications regularly not applicable    Medication side effects: No    Have you had an eye exam in the past two years? yes    Do you see a dentist twice per year? no    Do you have sleep apnea, excessive snoring or daytime drowsiness?yes -daytime drowsiness    History of bicuspid aortic valve. Hasn't had an echo or seen a cardiologist for  Years. Notes some chest pain at rest and with activity. No profound sob. No fainting or dizziness.     Today's PHQ-2 Score:   PHQ-2 ( 1999 Pf[employer]) 2/6/2019 10/10/2012   Q1: Little interest or pleasure in doing things 0 0   Q2: Feeling down, depressed or hopeless 0 1   PHQ-2 Score 0 1       Abuse: Current or Past(Physical, Sexual or Emotional)- No  Do you feel safe in your environment? Yes    Social History     Tobacco Use     Smoking status: Current Every Day Smoker     Packs/day: 0.50     Types: Cigarettes     Start date: 1/1/2015     Smokeless tobacco: Never Used   Substance Use Topics     Alcohol use: No     If you drink alcohol do you typically have >3 drinks per day or >7 drinks per week? No                      Last PSA: No results found for: PSA    Reviewed orders with patient. Reviewed health maintenance and updated orders accordingly - Yes      Reviewed and updated as needed this visit by clinical staff  Tobacco  Allergies  Meds  Med Hx  Surg Hx  Fam Hx  Soc Hx        Reviewed and updated as needed this visit by Provider            ROS:  CONSTITUTIONAL: NEGATIVE for fever, chills, change in weight  INTEGUMENTARY/SKIN: NEGATIVE for worrisome rashes, moles or lesions  EYES: NEGATIVE for vision changes or irritation  ENT: NEGATIVE for ear, mouth and  "throat problems  RESP: NEGATIVE for significant cough or SOB  CV: NEGATIVE for chest pain, palpitations or peripheral edema  GI: NEGATIVE for nausea, abdominal pain, heartburn, or change in bowel habits   male: negative for dysuria, hematuria, decreased urinary stream, erectile dysfunction, urethral discharge  MUSCULOSKELETAL: NEGATIVE for significant arthralgias or myalgia  NEURO: NEGATIVE for weakness, dizziness or paresthesias  PSYCHIATRIC: NEGATIVE for changes in mood or affect    OBJECTIVE:   /86 (BP Location: Left arm, Cuff Size: Adult Large)   Pulse 88   Temp 98.4  F (36.9  C) (Tympanic)   Ht 1.75 m (5' 8.9\")   Wt 107.7 kg (237 lb 6.4 oz)   SpO2 98%   BMI 35.16 kg/m    EXAM:  GENERAL: healthy, alert and no distress  EYES: Eyes grossly normal to inspection, PERRL and conjunctivae and sclerae normal  HENT: ear canals and TM's normal, nose and mouth without ulcers or lesions  NECK: no adenopathy, no asymmetry, masses, or scars and thyroid normal to palpation  RESP: lungs clear to auscultation - no rales, rhonchi or wheezes  CV: regular rate and rhythm, normal S1 S2, no S3 or S4, no murmur, click or rub, no peripheral edema and peripheral pulses strong  ABDOMEN: soft, nontender, no hepatosplenomegaly, no masses and bowel sounds normal  MS: no gross musculoskeletal defects noted, no edema  SKIN: no suspicious lesions or rashes  NEURO: Normal strength and tone, mentation intact and speech normal  PSYCH: mentation appears normal, affect normal/bright        ASSESSMENT/PLAN:     COUNSELING:  Reviewed preventive health counseling, as reflected in patient instructions       Regular exercise       Healthy diet/nutrition    BP Readings from Last 1 Encounters:   02/06/19 126/86     Estimated body mass index is 35.16 kg/m  as calculated from the following:    Height as of this encounter: 1.75 m (5' 8.9\").    Weight as of this encounter: 107.7 kg (237 lb 6.4 oz).      Weight management plan: Discussed healthy " diet and exercise guidelines     reports that he has been smoking cigarettes.  He started smoking about 4 years ago. He has been smoking about 0.50 packs per day. he has never used smokeless tobacco.  Tobacco Cessation Action Plan: Information offered: Patient not interested at this time    Counseling Resources:  ATP IV Guidelines  Pooled Cohorts Equation Calculator  FRAX Risk Assessment  ICSI Preventive Guidelines  Dietary Guidelines for Americans, 2010  [employer] MyPlate  ASA Prophylaxis  Lung CA Screening    Mayo Curry PA-C  [facility]

## 2019-08-06 ENCOUNTER — OFFICE VISIT (OUTPATIENT)
Dept: ORTHOPEDICS | Facility: CLINIC | Age: 24
End: 2019-08-06
Payer: COMMERCIAL

## 2019-08-06 ENCOUNTER — ANCILLARY PROCEDURE (OUTPATIENT)
Dept: GENERAL RADIOLOGY | Facility: CLINIC | Age: 24
End: 2019-08-06
Attending: ORTHOPAEDIC SURGERY
Payer: COMMERCIAL

## 2019-08-06 VITALS — DIASTOLIC BLOOD PRESSURE: 77 MMHG | HEART RATE: 90 BPM | OXYGEN SATURATION: 97 % | SYSTOLIC BLOOD PRESSURE: 132 MMHG

## 2019-08-06 DIAGNOSIS — M22.2X1 PATELLOFEMORAL PAIN SYNDROME OF RIGHT KNEE: Primary | ICD-10-CM

## 2019-08-06 DIAGNOSIS — S80.01XA CONTUSION OF RIGHT KNEE, INITIAL ENCOUNTER: ICD-10-CM

## 2019-08-06 PROCEDURE — 73562 X-RAY EXAM OF KNEE 3: CPT | Mod: RT

## 2019-08-06 PROCEDURE — 99203 OFFICE O/P NEW LOW 30 MIN: CPT | Performed by: ORTHOPAEDIC SURGERY

## 2019-08-06 ASSESSMENT — PAIN SCALES - GENERAL: PAINLEVEL: MODERATE PAIN (5)

## 2019-08-06 NOTE — LETTER
8/6/2019         RE: Juan Lay  [address] [place] [id number]        Dear Colleague,    Thank you for referring your patient, Juan Lay, to the [facility]. Please see a copy of my visit note below.    SUBJECTIVE:   Juan Lay is a 23 year old male who is seen as self referral for evaluation of right knee injury that occurred 4 weeks ago. direct blow to knee.  Pain started 2 weeks ago.     Present symptoms: pain medially, posteriorly, swelling.  Pain to walk, squat.   Pain pivoting, side-side.  No catching, locking or giving-way     Treatments tried to this point: NSAIDs and tylenol, ice    Orthopedic PMH: LEFT arthroscopy, and loose body removal, microfracture     Review of Systems:  Constitutional:  NEGATIVE for fever, chills, change in weight  Integumentary/Skin:  NEGATIVE for worrisome rashes, moles or lesions  Eyes:  NEGATIVE for vision changes or irritation  ENT/Mouth:  NEGATIVE for ear, mouth and throat problems  Resp:  NEGATIVE for significant cough or SOB  Breast:  NEGATIVE for masses, tenderness or discharge  CV:  NEGATIVE for chest pain, palpitations or peripheral edema  GI:  NEGATIVE for nausea, abdominal pain, heartburn, or change in bowel habits  :  Negative   Musculoskeletal:  See HPI above  Neuro:  NEGATIVE for weakness, dizziness or paresthesias  Endocrine:  NEGATIVE for temperature intolerance, skin/hair changes  Heme/allergy/immune:  NEGATIVE for bleeding problems  Psychiatric:  NEGATIVE for changes in mood or affect    Past Medical History:   Past Medical History:   Diagnosis Date     Bicuspid aortic valve      Left knee injury      Need for SBE (subacute bacterial endocarditis) prophylaxis 10/10/2012     Past Surgical History:   Past Surgical History:   Procedure Laterality Date     ARTHROSCOPY KNEE  4/5/13    microfracture with loose body removal     EXTRACTION(S) DENTAL       KNEE SURGERY       Family History:   Family History    Problem Relation Age of Onset     Substance Abuse Paternal Uncle      Substance Abuse Paternal Uncle      Substance Abuse Paternal Aunt      Depression Maternal Grandmother      Substance Abuse Maternal Grandfather      Depression Maternal Grandfather      Asthma Brother      Cardiovascular Maternal Aunt 39        heart surgery in [facility] to be done 2011     Social History:   Social History     Tobacco Use     Smoking status: Current Every Day Smoker     Packs/day: 0.50     Types: Cigarettes     Start date: 1/1/2015     Smokeless tobacco: Never Used   Substance Use Topics     Alcohol use: No     OBJECTIVE:  Physical Exam:  There were no vitals taken for this visit.  General Appearance: healthy, alert and no distress   Skin: no suspicious lesions or rashes  Neuro: Normal strength and tone, mentation intact and speech normal  Vascular: good pulses, and cappillary refill   Lymph: no lymphadenopathy   Psych:  mentation appears normal and affect normal/bright  Resp: no increased work of breathing     Right Knee Exam:  Gait: walks with normal gait  Alignment: normal   Squat: 100 % painfree.    Patellofemoral joint: mild crepitations in the patellofemoral joint.  Effusion: mild  ROM: 5 hyperextension to full flexion  Tender: lateral joint line and medial facet of the patella  Masses: none  Ligaments:   Lachman's: stable    Anterior Drawer: stable    Posterior drawer: stable    Varus/Valgus stress: stable   McMurrays: negative    X-rays:  Obtained today of the right knee: 3-views, reviewed in the office with the patient show slight lateral tilt of the patella, otherwise normal.     ASSESSMENT:   Knee contusion  Patellofemoral pain     PLAN:   Knee sleeve given  physical therapy ordered.  Strengthening   Nsaids, tylenol    Return to clinic: as needed     JOSÉ MANUEL Murphy MD  Dept. Orthopedic Surgery  [facility]       Again, thank you for allowing me to participate in the care of your patient.         Sincerely,        Roni Murphy MD

## 2019-08-06 NOTE — PROGRESS NOTES
SUBJECTIVE:   Juan Lay is a 23 year old male who is seen as self referral for evaluation of right knee injury that occurred 4 weeks ago. direct blow to knee.  Pain started 2 weeks ago.     Present symptoms: pain medially, posteriorly, swelling.  Pain to walk, squat.   Pain pivoting, side-side.  No catching, locking or giving-way     Treatments tried to this point: NSAIDs and tylenol, ice    Orthopedic PMH: LEFT arthroscopy, and loose body removal, microfracture     Review of Systems:  Constitutional:  NEGATIVE for fever, chills, change in weight  Integumentary/Skin:  NEGATIVE for worrisome rashes, moles or lesions  Eyes:  NEGATIVE for vision changes or irritation  ENT/Mouth:  NEGATIVE for ear, mouth and throat problems  Resp:  NEGATIVE for significant cough or SOB  Breast:  NEGATIVE for masses, tenderness or discharge  CV:  NEGATIVE for chest pain, palpitations or peripheral edema  GI:  NEGATIVE for nausea, abdominal pain, heartburn, or change in bowel habits  :  Negative   Musculoskeletal:  See HPI above  Neuro:  NEGATIVE for weakness, dizziness or paresthesias  Endocrine:  NEGATIVE for temperature intolerance, skin/hair changes  Heme/allergy/immune:  NEGATIVE for bleeding problems  Psychiatric:  NEGATIVE for changes in mood or affect    Past Medical History:   Past Medical History:   Diagnosis Date     Bicuspid aortic valve      Left knee injury      Need for SBE (subacute bacterial endocarditis) prophylaxis 10/10/2012     Past Surgical History:   Past Surgical History:   Procedure Laterality Date     ARTHROSCOPY KNEE  4/5/13    microfracture with loose body removal     EXTRACTION(S) DENTAL       KNEE SURGERY       Family History:   Family History   Problem Relation Age of Onset     Substance Abuse Paternal Uncle      Substance Abuse Paternal Uncle      Substance Abuse Paternal Aunt      Depression Maternal Grandmother      Substance Abuse Maternal Grandfather      Depression Maternal Grandfather       Asthma Brother      Cardiovascular Maternal Aunt 39        heart surgery in [facility] to be done 2011     Social History:   Social History     Tobacco Use     Smoking status: Current Every Day Smoker     Packs/day: 0.50     Types: Cigarettes     Start date: 1/1/2015     Smokeless tobacco: Never Used   Substance Use Topics     Alcohol use: No     OBJECTIVE:  Physical Exam:  There were no vitals taken for this visit.  General Appearance: healthy, alert and no distress   Skin: no suspicious lesions or rashes  Neuro: Normal strength and tone, mentation intact and speech normal  Vascular: good pulses, and cappillary refill   Lymph: no lymphadenopathy   Psych:  mentation appears normal and affect normal/bright  Resp: no increased work of breathing     Right Knee Exam:  Gait: walks with normal gait  Alignment: normal   Squat: 100 % painfree.    Patellofemoral joint: mild crepitations in the patellofemoral joint.  Effusion: mild  ROM: 5 hyperextension to full flexion  Tender: lateral joint line and medial facet of the patella  Masses: none  Ligaments:   Lachman's: stable    Anterior Drawer: stable    Posterior drawer: stable    Varus/Valgus stress: stable   McMurrays: negative    X-rays:  Obtained today of the right knee: 3-views, reviewed in the office with the patient show slight lateral tilt of the patella, otherwise normal.     ASSESSMENT:   Knee contusion  Patellofemoral pain     PLAN:   Knee sleeve given  physical therapy ordered.  Strengthening   Nsaids, tylenol    Return to clinic: as needed     JOSÉ MANUEL Murphy MD  Dept. Orthopedic Surgery  [facility]

## 2020-03-02 ENCOUNTER — HEALTH MAINTENANCE LETTER (OUTPATIENT)
Age: 25
End: 2020-03-02

## 2020-05-01 ENCOUNTER — OFFICE VISIT (OUTPATIENT)
Dept: URGENT CARE | Facility: URGENT CARE | Age: 25
End: 2020-05-01
Payer: COMMERCIAL

## 2020-05-01 VITALS
DIASTOLIC BLOOD PRESSURE: 84 MMHG | SYSTOLIC BLOOD PRESSURE: 128 MMHG | BODY MASS INDEX: 34.36 KG/M2 | WEIGHT: 240 LBS | TEMPERATURE: 98.6 F | HEIGHT: 70 IN | OXYGEN SATURATION: 99 % | HEART RATE: 76 BPM

## 2020-05-01 DIAGNOSIS — R05.9 COUGH: Primary | ICD-10-CM

## 2020-05-01 PROCEDURE — 99214 OFFICE O/P EST MOD 30 MIN: CPT | Performed by: PHYSICIAN ASSISTANT

## 2020-05-01 RX ORDER — ALBUTEROL SULFATE 90 UG/1
2 AEROSOL, METERED RESPIRATORY (INHALATION) EVERY 4 HOURS PRN
Qty: 1 INHALER | Refills: 0 | Status: SHIPPED | OUTPATIENT
Start: 2020-05-01 | End: 2023-07-10

## 2020-05-01 RX ORDER — DOXYCYCLINE 100 MG/1
100 CAPSULE ORAL 2 TIMES DAILY WITH MEALS
Qty: 20 CAPSULE | Refills: 0 | Status: SHIPPED | OUTPATIENT
Start: 2020-05-01 | End: 2020-05-11

## 2020-05-01 RX ORDER — BENZONATATE 200 MG/1
200 CAPSULE ORAL 3 TIMES DAILY PRN
Qty: 30 CAPSULE | Refills: 0 | Status: SHIPPED | OUTPATIENT
Start: 2020-05-01 | End: 2020-05-11

## 2020-05-01 ASSESSMENT — MIFFLIN-ST. JEOR: SCORE: 2089.88

## 2020-05-01 ASSESSMENT — ENCOUNTER SYMPTOMS
CARDIOVASCULAR NEGATIVE: 1
CHEST TIGHTNESS: 0
CHILLS: 0
COUGH: 1
SINUS PAIN: 0
WHEEZING: 0
GASTROINTESTINAL NEGATIVE: 1
SINUS PRESSURE: 0
FATIGUE: 0
RHINORRHEA: 0
PALPITATIONS: 0
FEVER: 1
SHORTNESS OF BREATH: 1
SORE THROAT: 0

## 2020-05-01 NOTE — PROGRESS NOTES
Subjective   Juan Lay is a 24 year old male who presents to clinic today for the following health issues:  HPI   RESPIRATORY SYMPTOMS    Duration: 2days    Description  nasal congestion, productive cough, fever, nausea, diarrhea and shortness of breath    Severity: moderate    Accompanying signs and symptoms:  No sore throat or sinus congestion/pain/pressure.   No wheezing or hemoptysis.     History (predisposing factors):  Ill contacts at home.  No pmh of asthma.  Non-smoker. No recent travel or exposure to known COVID19    Precipitating or alleviating factors: None    Therapies tried and outcome:  rest and fluids acetaminophen with minimal relief    Patient Active Problem List   Diagnosis     Suicidal ideation     Posttraumatic stress disorder     Depressive disorder, not elsewhere classified     Cannabis dependence (H)     Alcohol abuse     Opioid abuse (H)     Hallucinogen abuse (H)     Undiagnosed cardiac murmurs     Bicuspid aortic valve     Need for SBE (subacute bacterial endocarditis) prophylaxis     Skin rash     Patellar dislocation     Chondral loose body of knee joint     Chondral lesion     Moderate recurrent major depression (H)     Anxiety     Past Surgical History:   Procedure Laterality Date     ARTHROSCOPY KNEE  4/5/13    microfracture with loose body removal     EXTRACTION(S) DENTAL       KNEE SURGERY         Social History     Tobacco Use     Smoking status: Current Every Day Smoker     Packs/day: 0.50     Types: Cigarettes     Start date: 1/1/2015     Smokeless tobacco: Never Used   Substance Use Topics     Alcohol use: No     Family History   Problem Relation Age of Onset     Substance Abuse Paternal Uncle      Substance Abuse Paternal Uncle      Substance Abuse Paternal Aunt      Depression Maternal Grandmother      Substance Abuse Maternal Grandfather      Depression Maternal Grandfather      Asthma Brother      Cardiovascular Maternal Aunt 39        heart surgery in [facility]  "to be done 2011         Current Outpatient Medications   Medication Sig Dispense Refill     MELATONIN PO As needed       Allergies   Allergen Reactions     Zithromax [Azithromycin Dihydrate]      Amoxicillin      Amoxicillin Hives     Azithromycin Hives     Zithromax [Azithromycin Dihydrate]      Reviewed and updated as needed this visit by Provider       Review of Systems   Constitutional: Positive for fever. Negative for chills and fatigue.   HENT: Negative for congestion, ear discharge, ear pain, hearing loss, rhinorrhea, sinus pressure, sinus pain and sore throat.    Respiratory: Positive for cough and shortness of breath. Negative for chest tightness and wheezing.    Cardiovascular: Negative.  Negative for chest pain, palpitations and peripheral edema.   Gastrointestinal: Negative.    All other systems reviewed and are negative.           Objective    /84   Pulse 76   Temp 98.6  F (37  C)   Ht 1.778 m (5' 10\")   Wt 108.9 kg (240 lb)   SpO2 99%   BMI 34.44 kg/m    Body mass index is 34.44 kg/m .  Physical Exam  Vitals signs and nursing note reviewed.   Constitutional:       General: He is not in acute distress.     Appearance: Normal appearance. He is obese. He is not ill-appearing.   HENT:      Head: Normocephalic and atraumatic.      Ears:      Comments: TMs are intact without any erythema or bulging bilaterally.  Airway is patent.     Nose: Nose normal.      Mouth/Throat:      Lips: Pink.      Mouth: Mucous membranes are moist.      Pharynx: Oropharynx is clear. Uvula midline. No pharyngeal swelling, oropharyngeal exudate, posterior oropharyngeal erythema or uvula swelling.      Tonsils: No tonsillar exudate or tonsillar abscesses.   Eyes:      General: No scleral icterus.     Conjunctiva/sclera: Conjunctivae normal.      Pupils: Pupils are equal, round, and reactive to light.   Neck:      Musculoskeletal: Normal range of motion and neck supple.      Thyroid: No thyromegaly.   Cardiovascular:      " Rate and Rhythm: Normal rate and regular rhythm.      Pulses: Normal pulses.      Heart sounds: Normal heart sounds, S1 normal and S2 normal. No murmur. No friction rub. No gallop.    Pulmonary:      Effort: Pulmonary effort is normal. No tachypnea, accessory muscle usage, respiratory distress or retractions.      Breath sounds: Normal breath sounds and air entry. No stridor. No decreased breath sounds, wheezing, rhonchi or rales.   Lymphadenopathy:      Cervical: No cervical adenopathy.   Skin:     General: Skin is warm and dry.      Findings: No rash.   Neurological:      Mental Status: He is alert and oriented to person, place, and time.   Psychiatric:         Mood and Affect: Mood normal.         Behavior: Behavior normal.         Thought Content: Thought content normal.         Judgment: Judgment normal.             Assessment & Plan   Cough:  He declined CXR.  Will empirially treat with [id number], tessalon perles, and albuterol inh as needed for symptoms.  May need COVID19 as his Mom works at a nursing home.  Recommend treatment with rest, fluids and chicken soup. Tylenol/ibuprofen prn fever/pain.  Recheck in clinic if symptoms worsen or if symptoms do not improve.  To the ER if he develops hemoptysis, chest pain, fevers>102, worsening shortness of breath/wheezing.  Recommend self quarantine until at least 7days since onset and until he has been fever free for 3days without having to take tylenol/ibuprofen.    -     doxycycline monohydrate (MONODOX) 100 MG capsule; Take 1 capsule (100 mg) by mouth 2 times daily (with meals) for 10 days Increases risk of heartburn and also sun sensitivity or sun burn. Contraindicated in pregnancy.  -     benzonatate (TESSALON) 200 MG capsule; Take 1 capsule (200 mg) by mouth 3 times daily as needed for cough  -     albuterol (PROAIR HFA/PROVENTIL HFA/VENTOLIN HFA) 108 (90 Base) MCG/ACT inhaler; Inhale 2 puffs into the lungs every 4 hours as needed for shortness of breath /  dyspnea or wheezing Use with spacer           Brook See NASH Cavazos  [facility]

## 2020-05-01 NOTE — LETTER
[address] [address]    May 1, 2020    Re: Juan Lay  [address] [facility] [address]  [phone] (home)     : 1995      To Whom It May Concern:      Juan Lay was seen in clinic today and is unable to work until he has been fever free for 3days without taking tylenol/ibuprofen and until it has been at least 7days since onset of symptoms.  Please feel free to contact me via phone if you have any questions or concerns.        Sincerely,      Brook See NASH Cavazos

## 2020-07-03 ENCOUNTER — TRANSFERRED RECORDS (OUTPATIENT)
Dept: HEALTH INFORMATION MANAGEMENT | Facility: CLINIC | Age: 25
End: 2020-07-03

## 2020-12-20 ENCOUNTER — HEALTH MAINTENANCE LETTER (OUTPATIENT)
Age: 25
End: 2020-12-20

## 2021-09-29 ENCOUNTER — IMPORTED ENCOUNTER (OUTPATIENT)
Dept: URBAN - NONMETROPOLITAN AREA CLINIC 1 | Facility: CLINIC | Age: 26
End: 2021-09-29

## 2021-09-29 PROBLEM — T15.01XA: Noted: 2021-09-29

## 2021-09-29 PROCEDURE — 65222 REMOVE FOREIGN BODY FROM EYE: CPT

## 2021-09-29 PROCEDURE — 99203 OFFICE O/P NEW LOW 30 MIN: CPT

## 2021-10-03 ENCOUNTER — HEALTH MAINTENANCE LETTER (OUTPATIENT)
Age: 26
End: 2021-10-03

## 2022-01-23 ENCOUNTER — HEALTH MAINTENANCE LETTER (OUTPATIENT)
Age: 27
End: 2022-01-23

## 2022-04-09 ASSESSMENT — VISUAL ACUITY
OS_CC: 20/20
OD_CC: 20/25+2

## 2022-09-04 ENCOUNTER — HEALTH MAINTENANCE LETTER (OUTPATIENT)
Age: 27
End: 2022-09-04

## 2023-04-29 ENCOUNTER — HEALTH MAINTENANCE LETTER (OUTPATIENT)
Age: 28
End: 2023-04-29

## 2023-07-10 ENCOUNTER — OFFICE VISIT (OUTPATIENT)
Dept: FAMILY MEDICINE | Facility: CLINIC | Age: 28
End: 2023-07-10
Payer: COMMERCIAL

## 2023-07-10 VITALS
RESPIRATION RATE: 18 BRPM | HEART RATE: 65 BPM | HEIGHT: 69 IN | BODY MASS INDEX: 38.51 KG/M2 | OXYGEN SATURATION: 98 % | DIASTOLIC BLOOD PRESSURE: 88 MMHG | TEMPERATURE: 98.3 F | SYSTOLIC BLOOD PRESSURE: 138 MMHG | WEIGHT: 260 LBS

## 2023-07-10 DIAGNOSIS — F19.21: ICD-10-CM

## 2023-07-10 DIAGNOSIS — R53.83 FATIGUE, UNSPECIFIED TYPE: Primary | ICD-10-CM

## 2023-07-10 LAB
ANION GAP SERPL CALCULATED.3IONS-SCNC: 11 MMOL/L (ref 7–15)
BASOPHILS # BLD AUTO: 0 10E3/UL (ref 0–0.2)
BASOPHILS NFR BLD AUTO: 1 %
BUN SERPL-MCNC: 11.9 MG/DL (ref 6–20)
CALCIUM SERPL-MCNC: 8.9 MG/DL (ref 8.6–10)
CHLORIDE SERPL-SCNC: 105 MMOL/L (ref 98–107)
CREAT SERPL-MCNC: 1.06 MG/DL (ref 0.67–1.17)
DEPRECATED HCO3 PLAS-SCNC: 24 MMOL/L (ref 22–29)
EOSINOPHIL # BLD AUTO: 0.4 10E3/UL (ref 0–0.7)
EOSINOPHIL NFR BLD AUTO: 5 %
ERYTHROCYTE [DISTWIDTH] IN BLOOD BY AUTOMATED COUNT: 13.6 % (ref 10–15)
GFR SERPL CREATININE-BSD FRML MDRD: >90 ML/MIN/1.73M2
GLUCOSE SERPL-MCNC: 99 MG/DL (ref 70–99)
HCT VFR BLD AUTO: 42.7 % (ref 40–53)
HGB BLD-MCNC: 14.7 G/DL (ref 13.3–17.7)
IMM GRANULOCYTES # BLD: 0.1 10E3/UL
IMM GRANULOCYTES NFR BLD: 1 %
LYMPHOCYTES # BLD AUTO: 2.6 10E3/UL (ref 0.8–5.3)
LYMPHOCYTES NFR BLD AUTO: 31 %
MCH RBC QN AUTO: 27.2 PG (ref 26.5–33)
MCHC RBC AUTO-ENTMCNC: 34.4 G/DL (ref 31.5–36.5)
MCV RBC AUTO: 79 FL (ref 78–100)
MONOCYTES # BLD AUTO: 0.6 10E3/UL (ref 0–1.3)
MONOCYTES NFR BLD AUTO: 7 %
NEUTROPHILS # BLD AUTO: 4.7 10E3/UL (ref 1.6–8.3)
NEUTROPHILS NFR BLD AUTO: 57 %
PLATELET # BLD AUTO: 222 10E3/UL (ref 150–450)
POTASSIUM SERPL-SCNC: 4.1 MMOL/L (ref 3.4–5.3)
RBC # BLD AUTO: 5.41 10E6/UL (ref 4.4–5.9)
SODIUM SERPL-SCNC: 140 MMOL/L (ref 136–145)
TSH SERPL DL<=0.005 MIU/L-ACNC: 3.97 UIU/ML (ref 0.3–4.2)
WBC # BLD AUTO: 8.3 10E3/UL (ref 4–11)

## 2023-07-10 PROCEDURE — 80048 BASIC METABOLIC PNL TOTAL CA: CPT | Performed by: FAMILY MEDICINE

## 2023-07-10 PROCEDURE — 84270 ASSAY OF SEX HORMONE GLOBUL: CPT | Performed by: FAMILY MEDICINE

## 2023-07-10 PROCEDURE — 84443 ASSAY THYROID STIM HORMONE: CPT | Performed by: FAMILY MEDICINE

## 2023-07-10 PROCEDURE — 84403 ASSAY OF TOTAL TESTOSTERONE: CPT | Performed by: FAMILY MEDICINE

## 2023-07-10 PROCEDURE — 99204 OFFICE O/P NEW MOD 45 MIN: CPT | Performed by: FAMILY MEDICINE

## 2023-07-10 PROCEDURE — 36415 COLL VENOUS BLD VENIPUNCTURE: CPT | Performed by: FAMILY MEDICINE

## 2023-07-10 PROCEDURE — 85025 COMPLETE CBC W/AUTO DIFF WBC: CPT | Performed by: FAMILY MEDICINE

## 2023-07-10 ASSESSMENT — PATIENT HEALTH QUESTIONNAIRE - PHQ9
10. IF YOU CHECKED OFF ANY PROBLEMS, HOW DIFFICULT HAVE THESE PROBLEMS MADE IT FOR YOU TO DO YOUR WORK, TAKE CARE OF THINGS AT HOME, OR GET ALONG WITH OTHER PEOPLE: SOMEWHAT DIFFICULT
SUM OF ALL RESPONSES TO PHQ QUESTIONS 1-9: 6
SUM OF ALL RESPONSES TO PHQ QUESTIONS 1-9: 6

## 2023-07-10 NOTE — PROGRESS NOTES
"  Assessment & Plan   Problem List Items Addressed This Visit    None  Visit Diagnoses     Fatigue, unspecified type    -  Primary    Relevant Orders    Basic metabolic panel  (Ca, Cl, CO2, Creat, Gluc, K, Na, BUN)    CBC with platelets and differential    Testosterone Free and Total    TSH with free T4 reflex         Initial fatigue workup - psych vs metabolic (testosterone at his request) - unclear prognosis    Nicotine/Tobacco Cessation:  He reports that he has been smoking cigarettes. He started smoking about 8 years ago. He has been smoking an average of .5 packs per day. He has never used smokeless tobacco.  Nicotine/Tobacco Cessation Plan:   Says he is actually down to zero now      BMI:   Estimated body mass index is 38.96 kg/m  as calculated from the following:    Height as of this encounter: 1.74 m (5' 8.5\").    Weight as of this encounter: 117.9 kg (260 lb).           DO WALLACE YOUSSEF [facility] NIKO Martinez is a 27 year old, presenting for the following health issues:  Pre Diabetes Check        7/10/2023     2:39 PM   Additional Questions   Roomed by Divya HATHAWAY CMA     History of Present Illness       Reason for visit:  Blood tests diabetes test    He eats 0-1 servings of fruits and vegetables daily.He consumes 2 sweetened beverage(s) daily.He exercises with enough effort to increase his heart rate 10 to 19 minutes per day.  He exercises with enough effort to increase his heart rate 3 or less days per week.   He is taking medications regularly.    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  Physical Exam   GENERAL: healthy, alert and no distress  NECK: no adenopathy, no asymmetry, masses, or scars and thyroid normal to palpation  RESP: lungs clear to auscultation - no rales, rhonchi or wheezes  CV: regular rate and rhythm, normal S1 S2, no S3 or S4, 2/6 systolic murmur right sternal border, no click or rub, no peripheral edema and peripheral pulses strong  MS: no gross musculoskeletal defects noted, no edema  PSYCH: affect flat                    "

## 2023-07-11 LAB — SHBG SERPL-SCNC: 26 NMOL/L (ref 11–80)

## 2023-07-12 LAB
TESTOST FREE SERPL-MCNC: 7.09 NG/DL
TESTOST SERPL-MCNC: 314 NG/DL (ref 240–950)

## 2023-09-25 ENCOUNTER — OFFICE VISIT (OUTPATIENT)
Dept: PHYSICAL MEDICINE AND REHAB | Facility: CLINIC | Age: 28
End: 2023-09-25
Payer: COMMERCIAL

## 2023-09-25 VITALS
SYSTOLIC BLOOD PRESSURE: 158 MMHG | HEART RATE: 80 BPM | DIASTOLIC BLOOD PRESSURE: 97 MMHG | HEIGHT: 70 IN | BODY MASS INDEX: 37.22 KG/M2 | WEIGHT: 260 LBS

## 2023-09-25 DIAGNOSIS — M54.2 NECK PAIN: Primary | ICD-10-CM

## 2023-09-25 DIAGNOSIS — M54.50 CHRONIC BILATERAL LOW BACK PAIN WITHOUT SCIATICA: ICD-10-CM

## 2023-09-25 DIAGNOSIS — G89.29 CHRONIC BILATERAL LOW BACK PAIN WITHOUT SCIATICA: ICD-10-CM

## 2023-09-25 PROCEDURE — 99203 OFFICE O/P NEW LOW 30 MIN: CPT | Performed by: NURSE PRACTITIONER

## 2023-09-25 ASSESSMENT — PAIN SCALES - GENERAL: PAINLEVEL: SEVERE PAIN (7)

## 2023-09-25 NOTE — LETTER
9/25/2023         RE: Juan Lay  [address] [facility] [address]        Dear Colleague,    Thank you for referring your patient, Juan Lay, to the [facility]. Please see a copy of my visit note below.    ASSESSMENT: Juan Lay is a 28 year old male presents for consultation at the request of PCP Clinic, [facility], who presents today for new patient evaluation of :     -Neck pain and back pain    Patient has slight bilateral EHL weakness on exam, otherwise neuro intact. No myelopathic or red flag symptoms.  Recommended starting physical therapy for his neck and back pain, and using over-the-counter ibuprofen for his pain.  He can return as needed.  If he does not continue to improve with physical therapy and plateaus, or if he begins to worsen, or if he develops any new symptoms into his legs, would recommend reevaluation.  We reviewed red flag symptoms for which she should call us.         No data to display                     Diagnoses and all orders for this visit:  Neck pain  -     Physical Therapy Referral; Future  Chronic bilateral low back pain without sciatica  -     Physical Therapy Referral; Future       PLAN:  Reviewed spine anatomy and disease process. Discussed diagnosis and treatment options with the patient today. A shared decision making model was used. The patient's values and choices were respected. The following represents what was discussed and decided upon by the provider and the patient.     -DIAGNOSTIC TESTS:  Images were personally reviewed and interpreted and explained to patient today using spine model.   -- I did not order any diagnostic imaging today    -PHYSICAL THERAPY:    -I ordered physical therapy for his neck and lower back today  Discussed the importance of core strengthening, ROM, stretching exercises with the patient and how each of these entities is important in decreasing pain.   Explained to the patient that the purpose of physical therapy is to teach the patient a home exercise program.  These exercises need to be performed every day in order to decrease pain and prevent future occurrences of pain.    -MEDICATIONS:    -Recommended ibuprofen 400 to 600 mg every 6 hours as needed for his episodes of neck and back pain  Discussed multiple medication options today with patient. Discussed risks, side effects, and proper use of medications. Patient verbalized understanding.    -INTERVENTIONS:    Discussed the role for, and some risks and benefits of injections with patient today.  Would need to have a greater conversation if this were the plan.    -PATIENT EDUCATION: Total time of 40 minutes, on the day of service, spent with the patient, reviewing the chart, placing orders, and documenting.   -Today we also discussed the issues related to the pros and cons of the current treatment plan.    -FOLLOW-UP:   Follow-up as needed    Advised patient to call the Spine Center if symptoms worsen or if they develop red flag symptoms such as numbness, weakness, severe pain uncontrolled by current pain med regimen, or any new or worsening problems controlling bladder and bowel function.   ______________________________________________________________________    SUBJECTIVE:   Juan Lay  is a 28 year old smoker  with history of PTSD, opioid use including ED visit for overdose in July 2023, bicuspid aortic valve, depression, anxiety, hypertension, obesity who presents today for new patient evaluation of back pain    His symptoms began 2 wks ago without injury. About a week and a half ago, the pain got worse. He endorses soreness in his mid-back. It is worse with lifting, engaging of the muscles at the gym. He lifts weights at the gym, powerlifting, deadlifts, squats. He describes the pain as tense, cramping, sometimes sharp. It sometimes radiates into the bottom of his neck and  surrounding muscles.  He also has chronic more right-sided lower back pain which flares up sometimes with sitting and standing.  He sometimes has burning on the soles of his feet, but this is not constant.  Sometimes the upper rib cage, sometimes the lower back are painful. Historically his symptoms improved with rest and a massage gun. He tried doing those things this time, and it actually hurt more. He has avoiding anything strenuous for the last 10 days and it has not improved.    He denies any radicular arm or leg pain, numbness or tingling, weakness, or change in bowel or bladder control    He has not tried any medication so far  He has not done any physical therapy recently  He has never had any epidural steroid injections into his back, or had any spinal surgeries.    He has not had any imaging of his back    -Treatment to Date:     -Medications:  -None    No current outpatient medications on file.     No current facility-administered medications for this visit.       Allergies   Allergen Reactions     Zithromax [Azithromycin Dihydrate]      Amoxicillin Hives     Azithromycin Hives       Past Medical History:   Diagnosis Date     Bicuspid aortic valve      Left knee injury      Need for SBE (subacute bacterial endocarditis) prophylaxis 10/10/2012        Patient Active Problem List   Diagnosis     Posttraumatic stress disorder     Bicuspid aortic valve     Chondral loose body of knee joint     Moderate recurrent major depression (H)     Combined opioid and non-opioid drug dependence, in remission (H)       Past Surgical History:   Procedure Laterality Date     ARTHROSCOPY KNEE  4/5/13    microfracture with loose body removal     EXTRACTION(S) DENTAL       KNEE SURGERY         Family History   Problem Relation Age of Onset     Substance Abuse Paternal Uncle      Substance Abuse Paternal Uncle      Substance Abuse Paternal Aunt      Depression Maternal Grandmother      Substance Abuse Maternal Grandfather       "Depression Maternal Grandfather      Asthma Brother      Cardiovascular Maternal Aunt 39        heart surgery in [facility] to be done 2011       Reviewed past medical, surgical, and family history with patient found on new patient intake packet located in EMR Media tab.     SOCIAL HX: Smoker, no alcohol use    Oswestry (KRISTIN) Questionnaire:         No data to display                Neck Disability Index:       No data to display                   PHQ-2 Score:       2/6/2019     2:29 PM 10/10/2012     7:32 AM   PHQ-2 ( 1999 [employer])   Q1: Little interest or pleasure in doing things 0 0   Q2: Feeling down, depressed or hopeless 0 1   PHQ-2 Score 0 1   PHQ-2 Total Score (12-17 Years)- Positive if 3 or more points; Administer PHQ-A if positive 0           ROS:Specifically negative for bowel/bladder dysfunction, balance changes, headache, dizziness, foot drop, fevers, chills, appetite changes, nausea/vomiting, unexplained weight loss. Otherwise 13 systems reviewed are negative. Please see the patient's intake questionnaire from today for details.    OBJECTIVE:  BP (!) 158/97   Pulse 80   Ht 5' 10\" (1.778 m)   Wt 260 lb (117.9 kg)   BMI 37.31 kg/m      PHYSICAL EXAMINATION:  --CONSTITUTIONAL: Vital signs as above. No acute distress. The patient is well nourished and well groomed.   --PSYCHIATRIC: The patient is awake, alert, oriented to person, place, and time, and answering questions appropriately with clear speech.  Flat affect, appropriate mood.  Mild delay in response time.  --HEENT: Sclera are mildly injected. Extraocular muscles are intact. Moist oral mucosa.  --SKIN: Skin over the face, bilateral upper extremities, and posterior torso is clean, dry, intact without rashes.  --RESPIRATORY: Normal rhythm and effort. No abnormal accessory muscle breathing patterns noted.     --NEUROLOGIC: CN III-XII are grossly intact.   --GROSS MOTOR: Easily arises from a seated position. Toe walking, heel walking, and tandem gait " are normal.      --UPPER EXTREMITY MOTOR TESTING:  Shoulder abduction: right 5/5, left 5/5  Triceps: right 5/5 left 5/5  Biceps:right 5/5  left 5/5,   Wrist flexion: right 5/5  left 5/5,   Wrist extension: right 5/5  left 5/5,   Hand :  right 5/5  left 5/5,   Intrinsics: right 5/5  left 5/5,   Extensors: right 5/5  left 5/5,     --LOWER EXTREMITY MOTOR TESTING  Hip flexion: right 5/5  left 5/5,   Quads:right 5/5  left 5/5,   Hamstrings: right 5/5  left 5/5,   Dorsiflexion: right 5/5  left 5/5,   Plantarflexion: right 5/5  left 5/5,   EHL: right 4+/5  left 4+/5,     REFLEXES:1/4 symmetric triceps, biceps, brachioradialis bilaterally.1/4 symmetric patellar, achilles reflex bilaterally.    Negative Clonus, Babinski, and Chaney's bilaterally.      SENSATION: of the upper and lower extremities is intact to light touch.   --VASCULAR: Warm upper and lower limbs bilaterally.     --CERVICAL SPINE: Inspection reveals no evidence of deformity or swelling. Range of motion is not limited in cervical flexion, extension, lateral rotation, head tilt. No point tenderness to palpation of cervical spine.  Mild tenderness to palpation of proximal right trap.  No tenderness to palpation of scaps, or paraspinal musculature.    Lumbar spine: Inspection reveals no evidence of deformity or swelling.  Range of motion is mildly limited in leftward rotation.  Range of motion is significantly limited in extension.  No limitations in lumbar flexion.  No lumbar spine point tenderness, paraspinal musculature tenderness, rashes or swelling.    --SHOULDERS:  No tenderness to palpation or swelling noted of AC joint.      RESULTS:   Prior medical records from [facility] and [facility] were reviewed today.         IMAGING:  Spine imaging was personally reviewed and interpreted today. The images were shown to the patient and the findings were explained using a spine model.     No results found.       Vianca Hinson FNP-C  [facility]  [facility]  O. [phone]      Again, thank you for allowing me to participate in the care of your patient.        Sincerely,        LIZETH Cadet CNP

## 2023-09-25 NOTE — PATIENT INSTRUCTIONS
~You have been referred for Physical Therapy to [facility]. They will call you to schedule an appointment.      Scheduling phone number is [phone] for [facility], or [place] location.  If you have not heard from the scheduling office within 2 business days, please call [phone] for ALL other locations.    Discussed the importance of core strengthening, ROM, stretching exercises and how each of these entities is important in decreasing pain and improving long term spine health.  The purpose of physical therapy is to teach you an individualized home exercise program.  These exercises need to be performed every day in order to decrease pain and prevent future occurrences of pain.           You can take over the counter ibuprofen 400mg-600mg every 6hrs as needed for your back pain to reduce swelling/inflammation in your joints.   Please take as prescribed, as needed for pain control as well as to aid in decreasing inflammation.   Take medication with a full glass of water and with food.   *Do not take Aleve or Naproxen while taking this medication as it can cause organ failure if taken together*  This medication does have risks if taken long-term, these risks include: gastrointestinal irritation, kidney dysfunction, and cardiovascular effects.  We will check your kidney function as needed if you're on the medication for a chronic period of time.  Stop taking this medication if you have intolerable side effects or blood in the stool.       ~Please call our [facility] Nurse Navigation line [phone] with any questions or concerns about your treatment plan, if symptoms worsen and you would like to be seen urgently, or if you have any new or worsening numbness, weakness, or problems controlling bladder and bowel function.  ~You are also welcome to contact Vianca Hinson via [employer], but please be aware that responses to [employer] message may take 2-3 days  due to the high volume of patients seen in clinic.

## 2023-09-25 NOTE — PROGRESS NOTES
ASSESSMENT: Juan Lay is a 28 year old male presents for consultation at the request of PCP Clinic, [facility], who presents today for new patient evaluation of :     -Neck pain and back pain    Patient has slight bilateral EHL weakness on exam, otherwise neuro intact. No myelopathic or red flag symptoms.  Recommended starting physical therapy for his neck and back pain, and using over-the-counter ibuprofen for his pain.  He can return as needed.  If he does not continue to improve with physical therapy and plateaus, or if he begins to worsen, or if he develops any new symptoms into his legs, would recommend reevaluation.  We reviewed red flag symptoms for which she should call us.         No data to display                     Diagnoses and all orders for this visit:  Neck pain  -     Physical Therapy Referral; Future  Chronic bilateral low back pain without sciatica  -     Physical Therapy Referral; Future       PLAN:  Reviewed spine anatomy and disease process. Discussed diagnosis and treatment options with the patient today. A shared decision making model was used. The patient's values and choices were respected. The following represents what was discussed and decided upon by the provider and the patient.     -DIAGNOSTIC TESTS:  Images were personally reviewed and interpreted and explained to patient today using spine model.   -- I did not order any diagnostic imaging today    -PHYSICAL THERAPY:    -I ordered physical therapy for his neck and lower back today  Discussed the importance of core strengthening, ROM, stretching exercises with the patient and how each of these entities is important in decreasing pain.  Explained to the patient that the purpose of physical therapy is to teach the patient a home exercise program.  These exercises need to be performed every day in order to decrease pain and prevent future occurrences of pain.    -MEDICATIONS:    -Recommended ibuprofen 400 to 600  mg every 6 hours as needed for his episodes of neck and back pain  -Could consider muscle relaxer for this patient, however with his history of sobriety and previous opioid use, would wish to hold off on this option for now if anti-inflammatories are of benefit instead.  Discussed multiple medication options today with patient. Discussed risks, side effects, and proper use of medications. Patient verbalized understanding.    -INTERVENTIONS:    Discussed the role for, and some risks and benefits of injections with patient today.  Would need to have a greater conversation if this were the plan.    -PATIENT EDUCATION: Total time of 40 minutes, on the day of service, spent with the patient, reviewing the chart, placing orders, and documenting.   -Today we also discussed the issues related to the pros and cons of the current treatment plan.    -FOLLOW-UP:   Follow-up as needed    Advised patient to call the Spine Center if symptoms worsen or if they develop red flag symptoms such as numbness, weakness, severe pain uncontrolled by current pain med regimen, or any new or worsening problems controlling bladder and bowel function.   ______________________________________________________________________    SUBJECTIVE:   Juan Lay  is a 28 year old smoker  with history of PTSD, opioid use including ED visit for overdose in July 2023, bicuspid aortic valve, anxiety, hypertension, prediabetes, obesity who presents today for new patient evaluation of back pain    His symptoms began 2 wks ago without injury. About a week and a half ago, the pain got worse.  The pain is a 6-7 out of 10 today.  At worst it is an 8.  At best it is a 4-5 out of 10.  He endorses soreness just to the right of his mid-back. It is worse with lifting and engaging of the muscles at the gym. He is quite active and lifts weights at the gym, does powerlifting, deadlifts, squats, MMA, wrestling, basketball. He describes the pain as tense,  cramping, sometimes sharp. It sometimes radiates into the bottom of his neck and surrounding muscles.  He also has chronic more right-sided lower back pain which flares up sometimes with sitting and standing.  He sometimes has burning on the soles of his feet, but this is not constant.  Sometimes the upper rib cage, flanks, sometimes the lower back are painful. Historically his symptoms improved with rest and a massage gun. He tried doing those things this time, and it actually hurt more. He has avoiding anything strenuous for the last 10 days and it has not improved.    He denies any radicular arm or leg pain, numbness or tingling, weakness, or change in bowel or bladder control    He has not tried any medication so far  He has not done any physical therapy recently  He has never had any epidural steroid injections into his back, or had any spinal surgeries.    He has not had any imaging of his back    -Treatment to Date:     -Medications:  -None    No current outpatient medications on file.     No current facility-administered medications for this visit.       Allergies   Allergen Reactions    Zithromax [Azithromycin Dihydrate]     Amoxicillin Hives    Azithromycin Hives       Past Medical History:   Diagnosis Date    Bicuspid aortic valve     Left knee injury     Need for SBE (subacute bacterial endocarditis) prophylaxis 10/10/2012        Patient Active Problem List   Diagnosis    Posttraumatic stress disorder    Bicuspid aortic valve    Chondral loose body of knee joint    Moderate recurrent major depression (H)    Combined opioid and non-opioid drug dependence, in remission (H)       Past Surgical History:   Procedure Laterality Date    ARTHROSCOPY KNEE  4/5/13    microfracture with loose body removal    EXTRACTION(S) DENTAL      KNEE SURGERY         Family History   Problem Relation Age of Onset    Substance Abuse Paternal Uncle     Substance Abuse Paternal Uncle     Substance Abuse Paternal Aunt      "Depression Maternal Grandmother     Substance Abuse Maternal Grandfather     Depression Maternal Grandfather     Asthma Brother     Cardiovascular Maternal Aunt 39        heart surgery in [facility] to be done 2011       Reviewed past medical, surgical, and family history with patient found on new patient intake packet located in EMR Media tab.     SOCIAL HX: Smoker, no alcohol use.  Endorses 3 years of sobriety from recreational drugs.  No heavy drinking history.    Oswestry (KRISTIN) Questionnaire:         No data to display                Neck Disability Index:       No data to display                   PHQ-2 Score:       2/6/2019     2:29 PM 10/10/2012     7:32 AM   PHQ-2 ( 1999 [employer])   Q1: Little interest or pleasure in doing things 0 0   Q2: Feeling down, depressed or hopeless 0 1   PHQ-2 Score 0 1   PHQ-2 Total Score (12-17 Years)- Positive if 3 or more points; Administer PHQ-A if positive 0           ROS: Positive for joint pain, muscle pain, muscle fatigue, sometimes insomnia, may be sciatica.  Specifically negative for bowel/bladder dysfunction, balance changes, headache, dizziness, foot drop, fevers, chills, appetite changes, nausea/vomiting, unexplained weight loss. Otherwise 13 systems reviewed are negative. Please see the patient's intake questionnaire from today for details.    OBJECTIVE:  BP (!) 158/97   Pulse 80   Ht 5' 10\" (1.778 m)   Wt 260 lb (117.9 kg)   BMI 37.31 kg/m      PHYSICAL EXAMINATION:  --CONSTITUTIONAL: Vital signs as above. No acute distress. The patient is well nourished and well groomed.   --PSYCHIATRIC: The patient is awake, alert, oriented to person, place, and time, and answering questions appropriately with clear speech.  Flat affect, appropriate mood.  Mild delay in response time.  --HEENT: Sclera are mildly injected. Extraocular muscles are intact. Moist oral mucosa.  --SKIN: Skin over the face, bilateral upper extremities, and posterior torso is clean, dry, intact without " rashes.  --RESPIRATORY: Normal rhythm and effort. No abnormal accessory muscle breathing patterns noted.     --NEUROLOGIC: CN III-XII are grossly intact.   --GROSS MOTOR: Easily arises from a seated position. Toe walking, heel walking, and tandem gait are normal.      --UPPER EXTREMITY MOTOR TESTING:  Shoulder abduction: right 5/5, left 5/5  Triceps: right 5/5 left 5/5  Biceps:right 5/5  left 5/5,   Wrist flexion: right 5/5  left 5/5,   Wrist extension: right 5/5  left 5/5,   Hand :  right 5/5  left 5/5,   Intrinsics: right 5/5  left 5/5,   Extensors: right 5/5  left 5/5,     --LOWER EXTREMITY MOTOR TESTING  Hip flexion: right 5/5  left 5/5,   Quads:right 5/5  left 5/5,   Hamstrings: right 5/5  left 5/5,   Dorsiflexion: right 5/5  left 5/5,   Plantarflexion: right 5/5  left 5/5,   EHL: right 4+/5  left 4+/5,     REFLEXES:1/4 symmetric triceps, biceps, brachioradialis bilaterally.1/4 symmetric patellar, achilles reflex bilaterally.    Negative Clonus, Babinski, and Chaney's bilaterally.      SENSATION: of the upper and lower extremities is intact to light touch.   --VASCULAR: Warm upper and lower limbs bilaterally.     --CERVICAL SPINE: Inspection reveals no evidence of deformity or swelling. Range of motion is not limited in cervical flexion, extension, lateral rotation, head tilt. No point tenderness to palpation of cervical spine.  Mild tenderness to palpation of proximal right trap.  No tenderness to palpation of scaps, or paraspinal musculature.    Lumbar spine: Inspection reveals no evidence of deformity or swelling.  Range of motion is mildly limited in leftward rotation.  Range of motion is significantly limited in extension.  No limitations in lumbar flexion.  No lumbar spine point tenderness, paraspinal musculature tenderness, rashes or swelling.    --SHOULDERS:  No tenderness to palpation or swelling noted of AC joint.      RESULTS:   Prior medical records from [facility] and [facility]  were reviewed today.         IMAGING:  Spine imaging was personally reviewed and interpreted today. The images were shown to the patient and the findings were explained using a spine model.     No results found.       Vianca PIZNONP-C  [facility]  O. [phone]

## 2024-02-14 ENCOUNTER — OFFICE VISIT (OUTPATIENT)
Dept: URGENT CARE | Facility: URGENT CARE | Age: 29
End: 2024-02-14
Payer: COMMERCIAL

## 2024-02-14 ENCOUNTER — ANCILLARY PROCEDURE (OUTPATIENT)
Dept: GENERAL RADIOLOGY | Facility: CLINIC | Age: 29
End: 2024-02-14
Attending: FAMILY MEDICINE
Payer: COMMERCIAL

## 2024-02-14 VITALS
DIASTOLIC BLOOD PRESSURE: 80 MMHG | TEMPERATURE: 97.5 F | OXYGEN SATURATION: 100 % | BODY MASS INDEX: 35.3 KG/M2 | SYSTOLIC BLOOD PRESSURE: 132 MMHG | HEART RATE: 72 BPM | WEIGHT: 246 LBS | RESPIRATION RATE: 16 BRPM

## 2024-02-14 DIAGNOSIS — Z71.1 CONCERN ABOUT DIABETES MELLITUS WITHOUT DIAGNOSIS: ICD-10-CM

## 2024-02-14 DIAGNOSIS — J02.9 PHARYNGITIS, UNSPECIFIED ETIOLOGY: ICD-10-CM

## 2024-02-14 DIAGNOSIS — Z20.822 COVID-19 RULED OUT: ICD-10-CM

## 2024-02-14 DIAGNOSIS — Z72.0 VAPES NICOTINE CONTAINING SUBSTANCE: ICD-10-CM

## 2024-02-14 DIAGNOSIS — R05.1 ACUTE COUGH: ICD-10-CM

## 2024-02-14 DIAGNOSIS — J00 ACUTE RHINITIS: Primary | ICD-10-CM

## 2024-02-14 LAB
DEPRECATED S PYO AG THROAT QL EIA: NEGATIVE
GROUP A STREP BY PCR: NOT DETECTED

## 2024-02-14 PROCEDURE — 71046 X-RAY EXAM CHEST 2 VIEWS: CPT | Mod: TC | Performed by: RADIOLOGY

## 2024-02-14 PROCEDURE — 99215 OFFICE O/P EST HI 40 MIN: CPT | Performed by: FAMILY MEDICINE

## 2024-02-14 PROCEDURE — 87651 STREP A DNA AMP PROBE: CPT | Performed by: FAMILY MEDICINE

## 2024-02-14 PROCEDURE — 87635 SARS-COV-2 COVID-19 AMP PRB: CPT | Performed by: FAMILY MEDICINE

## 2024-02-14 RX ORDER — GUAIFENESIN 1200 MG/1
1200 TABLET, EXTENDED RELEASE ORAL DAILY
Qty: 14 TABLET | Refills: 0 | Status: SHIPPED | OUTPATIENT
Start: 2024-02-14 | End: 2024-02-28

## 2024-02-14 RX ORDER — LORATADINE 10 MG/1
10 TABLET ORAL DAILY
Qty: 30 TABLET | Refills: 0 | Status: SHIPPED | OUTPATIENT
Start: 2024-02-14 | End: 2024-03-15

## 2024-02-14 RX ORDER — BENZONATATE 200 MG/1
200 CAPSULE ORAL 3 TIMES DAILY PRN
Qty: 30 CAPSULE | Refills: 0 | Status: SHIPPED | OUTPATIENT
Start: 2024-02-14 | End: 2024-08-30

## 2024-02-14 RX ORDER — DIPHENHYDRAMINE HCL 25 MG
25 TABLET ORAL
Qty: 30 TABLET | Refills: 0 | Status: SHIPPED | OUTPATIENT
Start: 2024-02-14 | End: 2024-08-30

## 2024-02-14 RX ORDER — FLUTICASONE PROPIONATE 50 MCG
1 SPRAY, SUSPENSION (ML) NASAL 2 TIMES DAILY
Qty: 16 G | Refills: 0 | Status: SHIPPED | OUTPATIENT
Start: 2024-02-14 | End: 2024-08-30

## 2024-02-14 ASSESSMENT — PAIN SCALES - GENERAL: PAINLEVEL: NO PAIN (0)

## 2024-02-14 NOTE — PROGRESS NOTES
ASSESSMENT/PLAN:      ICD-10-CM    1. Acute rhinitis  J00 fluticasone (FLONASE) 50 MCG/ACT nasal spray     loratadine (CLARITIN) 10 MG tablet     guaiFENesin 1200 MG TB12     diphenhydrAMINE (BENADRYL) 25 MG tablet      2. Acute cough  R05.1 XR Chest 2 Views     benzonatate (TESSALON) 200 MG capsule     diphenhydrAMINE (BENADRYL) 25 MG tablet      3. Vapes nicotine containing substance  Z72.0 XR Chest 2 Views     nicotine (NICORETTE) 4 MG lozenge      4. Pharyngitis, unspecified etiology  J02.9 Streptococcus A Rapid Screen w/Reflex to PCR - Clinic Collect     Group A Streptococcus PCR Throat Swab      5. COVID-19 ruled out  Z20.822 Asymptomatic COVID-19 Virus (Coronavirus) by PCR Nose     XR Chest 2 Views          Patient Instructions     You DO NOT have prediabetes    You last fasting blood sugar at your annual visit 7/10/23 was 99 and your provider ( and I agree ) said this is normal     Schedule your annual visit -due a few days after 7/10/2024 and request to have you A1C checked at that time     Prediabetes is a A!C >5.7, your last A1C was 5.5 9/6/2022      Stop vaping-if unable to stop vaping resume smoking cigarettes  Make an appointment with your PCP for chanitx/ options to stop smoking      Start Mucinex -1200 mg daily -the generic is fine -for congestion/sinus congestion-will also help with fluid behind the ear drums       Start Flonase1 spray in each nostril in am and bedtime for nasal congestion and drainage      Start Claritin 10 mg a day for 30 days      Take tessalon perles ( benzonatate) pills for cough up to 3 times a day will not make you sleepy       Take benadryl ( diphenhydramine) at bedtime will help with congestion and cough will make you sleepy        Addendum      These medications were ordered to your pharmacy -if these medications are not covered by your insurance, buy the generics of these medications  over the counter     Take these medications noted below for 14 days     Start  Mucinex -1200 mg daily -the generic is fine -for congestion/sinus congestion-for 14 days     Start Flonase1 spray in each nostril in am and bedtime for nasal congestion and drainage for 14 days       Take this medication for 30 days     Start Claritin 10 mg a day for 30 days       To stop smoking    Prescribed :     Start nicorette lozenges 4 mg up to every hour inside your cheek as needed for nicotine withdrawal symptoms                   Reviewed medication instructions and side effects. Follow up if experiences side effects.     I reviewed supportive care, otc meds to use if needed, expected course, and signs of concern.  Follow up as needed or if he does not improve within  1-2 days or if worsens in any way.  Reviewed red flag symptoms and is to go to the ER if experiences any of these.     The use of Dragon/GenomeQuestation services may have been used to construct the content in this note; any grammatical or spelling errors are non-intentional. Please contact the author of this note directly if you are in need of any clarification.      On the day of the encounter, time spend on chart review, patient visit, review of testing, documentation was 40 minutes              Patient presents with:  Covid Concern  Nasal Congestion: Runny nose. Sx onset- 2-3 days   Lab Only: Pt requesting A1C checked (Hx of borderline diabetic). Lab to check for cortisone.        Subjective     Juan Lay is a 28 year old male who presents to clinic today for the following health issues:    HPI       Acute Illness-  Acute illness concerns: patient with nasal congestion, fatigue, sore throat for last 2-3 days, concerned he may be diabetic due to fatigue   Symptoms:  Fever: No  Chills/Sweats: YES-chills   Headache (location?): YES- intermittent no headaches at this time  Sinus Pressure: No  Conjunctivitis:  No  Ear Pain: YES: bilateral ear fullness and pressure  Rhinorrhea: yes  Congestion: YES  Sore Throat: YES  Cough:  YES-non-productive  Wheeze: No  Decreased Appetite: No  Nausea: No  Vomiting: No  Diarrhea: No  Dysuria/Freq.: No, no  polyuria, no polydipsia  Dysuria or Hematuria: No  Fatigue/Achiness: yes  Sick/Strep Exposure: YES  Therapies tried and outcome: None-no recent ibuprofen or tylenol     Patient concerned he has diabetes, ? Prediabetes  Per chart rv with patient-last fasting blood sugar at annual visit 7/10/23 was 99 and  provider informed patient that 99 was WNL and A1C 5.5 on 9/6/2022     Vapes nicotine, cigarettes in past in last 10 years               Past Medical History:   Diagnosis Date    Bicuspid aortic valve     Left knee injury     Need for SBE (subacute bacterial endocarditis) prophylaxis 10/10/2012     Social History     Tobacco Use    Smoking status: Every Day     Packs/day: .5     Types: Cigarettes     Start date: 1/1/2015    Smokeless tobacco: Never   Substance Use Topics    Alcohol use: No       Current Outpatient Medications   Medication Sig Dispense Refill    benzonatate (TESSALON) 200 MG capsule Take 1 capsule (200 mg) by mouth 3 times daily as needed for cough 30 capsule 0    diphenhydrAMINE (BENADRYL) 25 MG tablet Take 1 tablet (25 mg) by mouth nightly as needed for other (congestion, cough, allergies) 30 tablet 0    fluticasone (FLONASE) 50 MCG/ACT nasal spray Spray 1 spray into both nostrils 2 times daily 16 g 0    guaiFENesin 1200 MG TB12 Take 1 tablet (1,200 mg) by mouth daily for 14 days 14 tablet 0    loratadine (CLARITIN) 10 MG tablet Take 1 tablet (10 mg) by mouth daily for 30 days 30 tablet 0    nicotine (NICORETTE) 4 MG lozenge Place 1 lozenge (4 mg) inside cheek every hour as needed for nicotine withdrawal symptoms 108 lozenge 0     Allergies   Allergen Reactions    Zithromax [Azithromycin Dihydrate]     Amoxicillin Hives    Azithromycin Hives             ROS are negative, except as otherwise noted HPI      Objective    /80 (BP Location: Left arm, Patient Position: Sitting, Cuff  Size: Adult Large)   Pulse 72   Temp 97.5  F (36.4  C) (Tympanic)   Resp 16   Wt 111.6 kg (246 lb)   SpO2 100%   BMI 35.30 kg/m    Body mass index is 35.3 kg/m .  Physical Exam     GENERAL: Alert.fatigued   Minimal distress.  HEENT: Normocephalic, atraumatic. PEERRLA, EOMI.  Scleras, lids and conjunctivae normal. Pinnas, canals and TM's dull, air/fluid levels bilateral  Nose congestion and oropharynx moist and peritonsillar erythema, and swelling  NECK: supple and a few shoddy anterior chain bilateral  adenopathy   CHEST:  Clear, no wheezing or rales. Normal symmetric air entry throughout both lung fields.   HEART:  S1 and S2 normal, no murmurs, clicks, gallops or rubs. Regular rate and rhythm.    NEURO:Alert and oriented x3, normal strength and tone, normal gait            Diagnostic Test Results:  Labs reviewed in Epic  Results for orders placed or performed in visit on 02/14/24   XR Chest 2 Views     Status: None    Narrative    XR CHEST 2 VIEWS 2/14/2024 1:07 PM    HISTORY: acute cough, vapes for 10+ years, decrease breath sounds  bibasilar; COVID-19 ruled out; Vapes nicotine containing substance;  Acute cough    COMPARISON: 3/27/2013      Impression    IMPRESSION: Normal.    GUSTAVO GORDON MD         SYSTEM ID:  [id number]   Results for orders placed or performed in visit on 02/14/24   Asymptomatic COVID-19 Virus (Coronavirus) by PCR Nose     Status: Normal    Specimen: Nose; Swab   Result Value Ref Range    SARS CoV2 PCR Negative Negative    Narrative    Testing was performed using the marina SARS-CoV-2 assay on the marina  [employer] System. This test should be ordered for the detection of  SARS-CoV-2 in individuals who meet SARS-CoV-2 clinical and/or  epidemiological criteria. Test performance is unknown in asymptomatic  patients. This test is for in vitro diagnostic use under the FDA EUA  for laboratories certified under CLIA to perform high and/or moderate  complexity testing. This test has not been FDA cleared  or approved. A  negative result does not rule out the presence of PCR inhibitors in  the specimen or target RNA in concentration below the limit of  detection for the assay. The possibility of a false negative should  be considered if the patient's recent exposure or clinical  presentation suggests COVID-19. This test was validated by the [facility]. This  laboratory is certified under the Clinical Laboratory Improvement  Amendments of 1988 (CLIA-88) as qualified to perform high and/or  moderate complexity laboratory testing.   Streptococcus A Rapid Screen w/Reflex to PCR - Clinic Collect     Status: Normal    Specimen: Throat; Swab   Result Value Ref Range    Group A Strep antigen Negative Negative   Group A Streptococcus PCR Throat Swab     Status: Normal    Specimen: Throat; Swab   Result Value Ref Range    Group A strep by PCR Not Detected Not Detected    Narrative    The Xpert Xpress Strep A test, performed on the [employer] Systems, is a rapid, qualitative in vitro diagnostic test for the detection of Streptococcus pyogenes (Group A ß-hemolytic Streptococcus, Strep A) in throat swab specimens from patients with signs and symptoms of pharyngitis. The Xpert Xpress Strep A test can be used as an aid in the diagnosis of Group A Streptococcal pharyngitis. The assay is not intended to monitor treatment for Group A Streptococcus infections. The Xpert Xpress Strep A test utilizes an automated real-time polymerase chain reaction (PCR) to detect Streptococcus pyogenes DNA.

## 2024-02-14 NOTE — PATIENT INSTRUCTIONS
You DO NOT have prediabetes    You last fasting blood sugar at your annual visit 7/10/23 was 99 and your provider ( and I agree ) said this is normal     Schedule your annual visit -due a few days after 7/10/2024 and request to have you A1C checked at that time     Prediabetes is a A!C >5.7, your last A1C was 5.5 9/6/2022      Stop vaping-if unable to stop vaping resume smoking cigarettes  Make an appointment with your PCP for chanitx/ options to stop smoking      Start Mucinex -1200 mg daily -the generic is fine -for congestion/sinus congestion-will also help with fluid behind the ear drums       Start Flonase1 spray in each nostril in am and bedtime for nasal congestion and drainage      Start Claritin 10 mg a day for 30 days      Take tessalon perles ( benzonatate) pills for cough up to 3 times a day will not make you sleepy       Take benadryl ( diphenhydramine) at bedtime will help with congestion and cough will make you sleepy        Addendum      These medications were ordered to your pharmacy -if these medications are not covered by your insurance, buy the generics of these medications  over the counter     Take these medications noted below for 14 days     Start Mucinex -1200 mg daily -the generic is fine -for congestion/sinus congestion-for 14 days     Start Flonase1 spray in each nostril in am and bedtime for nasal congestion and drainage for 14 days       Take this medication for 30 days     Start Claritin 10 mg a day for 30 days       To stop smoking    Prescribed :     Start nicorette lozenges 4 mg up to every hour inside your cheek as needed for nicotine withdrawal symptoms

## 2024-02-15 LAB — SARS-COV-2 RNA RESP QL NAA+PROBE: NEGATIVE

## 2024-06-06 ENCOUNTER — OFFICE VISIT (OUTPATIENT)
Dept: FAMILY MEDICINE | Facility: CLINIC | Age: 29
End: 2024-06-06
Payer: COMMERCIAL

## 2024-06-06 VITALS
OXYGEN SATURATION: 98 % | WEIGHT: 251 LBS | DIASTOLIC BLOOD PRESSURE: 88 MMHG | RESPIRATION RATE: 16 BRPM | SYSTOLIC BLOOD PRESSURE: 126 MMHG | HEIGHT: 70 IN | HEART RATE: 63 BPM | TEMPERATURE: 98 F | BODY MASS INDEX: 35.93 KG/M2

## 2024-06-06 DIAGNOSIS — R25.2 LEG CRAMP: ICD-10-CM

## 2024-06-06 DIAGNOSIS — R41.840 CONCENTRATION DEFICIT: Primary | ICD-10-CM

## 2024-06-06 DIAGNOSIS — Z13.1 SCREENING FOR DIABETES MELLITUS: ICD-10-CM

## 2024-06-06 LAB
BASOPHILS # BLD AUTO: 0 10E3/UL (ref 0–0.2)
BASOPHILS NFR BLD AUTO: 0 %
EOSINOPHIL # BLD AUTO: 0.3 10E3/UL (ref 0–0.7)
EOSINOPHIL NFR BLD AUTO: 3 %
ERYTHROCYTE [DISTWIDTH] IN BLOOD BY AUTOMATED COUNT: 13.9 % (ref 10–15)
FASTING STATUS PATIENT QL REPORTED: YES
FERRITIN SERPL-MCNC: 89 NG/ML (ref 31–409)
GLUCOSE SERPL-MCNC: 106 MG/DL (ref 70–99)
HCT VFR BLD AUTO: 42.6 % (ref 40–53)
HGB BLD-MCNC: 14.6 G/DL (ref 13.3–17.7)
IMM GRANULOCYTES # BLD: 0 10E3/UL
IMM GRANULOCYTES NFR BLD: 1 %
LYMPHOCYTES # BLD AUTO: 1.9 10E3/UL (ref 0.8–5.3)
LYMPHOCYTES NFR BLD AUTO: 26 %
MCH RBC QN AUTO: 27.2 PG (ref 26.5–33)
MCHC RBC AUTO-ENTMCNC: 34.3 G/DL (ref 31.5–36.5)
MCV RBC AUTO: 80 FL (ref 78–100)
MONOCYTES # BLD AUTO: 0.5 10E3/UL (ref 0–1.3)
MONOCYTES NFR BLD AUTO: 7 %
NEUTROPHILS # BLD AUTO: 4.8 10E3/UL (ref 1.6–8.3)
NEUTROPHILS NFR BLD AUTO: 64 %
PLATELET # BLD AUTO: 212 10E3/UL (ref 150–450)
RBC # BLD AUTO: 5.36 10E6/UL (ref 4.4–5.9)
WBC # BLD AUTO: 7.6 10E3/UL (ref 4–11)

## 2024-06-06 PROCEDURE — 99214 OFFICE O/P EST MOD 30 MIN: CPT | Performed by: FAMILY MEDICINE

## 2024-06-06 PROCEDURE — 85025 COMPLETE CBC W/AUTO DIFF WBC: CPT | Performed by: FAMILY MEDICINE

## 2024-06-06 PROCEDURE — 82947 ASSAY GLUCOSE BLOOD QUANT: CPT | Performed by: FAMILY MEDICINE

## 2024-06-06 PROCEDURE — 36415 COLL VENOUS BLD VENIPUNCTURE: CPT | Performed by: FAMILY MEDICINE

## 2024-06-06 PROCEDURE — 82728 ASSAY OF FERRITIN: CPT | Performed by: FAMILY MEDICINE

## 2024-06-06 ASSESSMENT — ANXIETY QUESTIONNAIRES
2. NOT BEING ABLE TO STOP OR CONTROL WORRYING: MORE THAN HALF THE DAYS
7. FEELING AFRAID AS IF SOMETHING AWFUL MIGHT HAPPEN: NOT AT ALL
6. BECOMING EASILY ANNOYED OR IRRITABLE: MORE THAN HALF THE DAYS
IF YOU CHECKED OFF ANY PROBLEMS ON THIS QUESTIONNAIRE, HOW DIFFICULT HAVE THESE PROBLEMS MADE IT FOR YOU TO DO YOUR WORK, TAKE CARE OF THINGS AT HOME, OR GET ALONG WITH OTHER PEOPLE: SOMEWHAT DIFFICULT
GAD7 TOTAL SCORE: 9
GAD7 TOTAL SCORE: 9
8. IF YOU CHECKED OFF ANY PROBLEMS, HOW DIFFICULT HAVE THESE MADE IT FOR YOU TO DO YOUR WORK, TAKE CARE OF THINGS AT HOME, OR GET ALONG WITH OTHER PEOPLE?: SOMEWHAT DIFFICULT
1. FEELING NERVOUS, ANXIOUS, OR ON EDGE: SEVERAL DAYS
3. WORRYING TOO MUCH ABOUT DIFFERENT THINGS: SEVERAL DAYS
4. TROUBLE RELAXING: MORE THAN HALF THE DAYS
7. FEELING AFRAID AS IF SOMETHING AWFUL MIGHT HAPPEN: NOT AT ALL
5. BEING SO RESTLESS THAT IT IS HARD TO SIT STILL: SEVERAL DAYS
GAD7 TOTAL SCORE: 9

## 2024-06-06 ASSESSMENT — PATIENT HEALTH QUESTIONNAIRE - PHQ9
SUM OF ALL RESPONSES TO PHQ QUESTIONS 1-9: 9
10. IF YOU CHECKED OFF ANY PROBLEMS, HOW DIFFICULT HAVE THESE PROBLEMS MADE IT FOR YOU TO DO YOUR WORK, TAKE CARE OF THINGS AT HOME, OR GET ALONG WITH OTHER PEOPLE: SOMEWHAT DIFFICULT
SUM OF ALL RESPONSES TO PHQ QUESTIONS 1-9: 9

## 2024-06-06 NOTE — PROGRESS NOTES
"  Assessment & Plan   Problem List Items Addressed This Visit    None  Visit Diagnoses       Concentration deficit    -  Primary    Relevant Orders    Adult Mental Health  Referral    Screening for diabetes mellitus        Relevant Orders    Glucose    Leg cramp        Relevant Orders    Ferritin    CBC with platelets and differential           See orders         Nicotine/Tobacco Cessation  He reports that he has been smoking vaping device. He has been exposed to tobacco smoke. He has never used smokeless tobacco.  Nicotine/Tobacco Cessation Plan        BMI  Estimated body mass index is 36.01 kg/m  as calculated from the following:    Height as of this encounter: 1.778 m (5' 10\").    Weight as of this encounter: 113.9 kg (251 lb).       Dony Martinez is a 28 year old, presenting for the following health issues:  Mental Health Problem, A.D.H.D, and Blood Draw (Would like to see if he has Diabetes)        6/6/2024     7:35 AM   Additional Questions   Roomed by Divya HATHAWAY CMA     Mental Health Problem    A.D.H.D    History of Present Illness       Reason for visit:  ADHD referral    He eats 0-1 servings of fruits and vegetables daily.He consumes 1 sweetened beverage(s) daily.He exercises with enough effort to increase his heart rate 9 or less minutes per day.  He exercises with enough effort to increase his heart rate 3 or less days per week.   He is taking medications regularly.   Low concentration, previously tested 2013 ADHD, tested neg but pos for depression, Chemical dependency - he would like to be retested  Wants to be screened for DM  Right thigh/leg cramp daily  Resolves 20 sec        Objective    /88 (BP Location: Right arm, Patient Position: Chair, Cuff Size: Adult Large)   Pulse 63   Temp 98  F (36.7  C) (Temporal)   Resp 16   Ht 1.778 m (5' 10\")   Wt 113.9 kg (251 lb)   SpO2 98%   BMI 36.01 kg/m    Body mass index is 36.01 kg/m .  Physical Exam   GENERAL: alert and no " distress  MS: no gross musculoskeletal defects noted, no edema            Signed Electronically by: CHAR MALDONADO DO

## 2024-06-27 ENCOUNTER — TRANSFERRED RECORDS (OUTPATIENT)
Dept: HEALTH INFORMATION MANAGEMENT | Facility: CLINIC | Age: 29
End: 2024-06-27
Payer: COMMERCIAL

## 2024-07-07 ENCOUNTER — HEALTH MAINTENANCE LETTER (OUTPATIENT)
Age: 29
End: 2024-07-07

## 2024-07-15 ENCOUNTER — MYC MEDICAL ADVICE (OUTPATIENT)
Dept: FAMILY MEDICINE | Facility: CLINIC | Age: 29
End: 2024-07-15

## 2024-07-15 ENCOUNTER — OFFICE VISIT (OUTPATIENT)
Dept: FAMILY MEDICINE | Facility: CLINIC | Age: 29
End: 2024-07-15
Payer: COMMERCIAL

## 2024-07-15 VITALS
HEIGHT: 70 IN | RESPIRATION RATE: 18 BRPM | DIASTOLIC BLOOD PRESSURE: 89 MMHG | BODY MASS INDEX: 35.65 KG/M2 | TEMPERATURE: 98.5 F | HEART RATE: 92 BPM | WEIGHT: 249 LBS | SYSTOLIC BLOOD PRESSURE: 127 MMHG | OXYGEN SATURATION: 99 %

## 2024-07-15 DIAGNOSIS — F90.2 ATTENTION DEFICIT HYPERACTIVITY DISORDER (ADHD), COMBINED TYPE: Primary | ICD-10-CM

## 2024-07-15 DIAGNOSIS — R10.31 RIGHT GROIN PAIN: ICD-10-CM

## 2024-07-15 PROCEDURE — 99214 OFFICE O/P EST MOD 30 MIN: CPT | Performed by: FAMILY MEDICINE

## 2024-07-15 RX ORDER — DEXTROAMPHETAMINE SACCHARATE, AMPHETAMINE ASPARTATE MONOHYDRATE, DEXTROAMPHETAMINE SULFATE AND AMPHETAMINE SULFATE 3.75; 3.75; 3.75; 3.75 MG/1; MG/1; MG/1; MG/1
15 CAPSULE, EXTENDED RELEASE ORAL EVERY MORNING
Qty: 30 CAPSULE | Refills: 0 | Status: SHIPPED | OUTPATIENT
Start: 2024-07-15

## 2024-07-15 RX ORDER — DEXTROAMPHETAMINE SACCHARATE, AMPHETAMINE ASPARTATE, DEXTROAMPHETAMINE SULFATE AND AMPHETAMINE SULFATE 1.875; 1.875; 1.875; 1.875 MG/1; MG/1; MG/1; MG/1
7.5 TABLET ORAL DAILY
Qty: 30 TABLET | Refills: 0 | Status: SHIPPED | OUTPATIENT
Start: 2024-07-15

## 2024-07-15 ASSESSMENT — PATIENT HEALTH QUESTIONNAIRE - PHQ9
SUM OF ALL RESPONSES TO PHQ QUESTIONS 1-9: 12
10. IF YOU CHECKED OFF ANY PROBLEMS, HOW DIFFICULT HAVE THESE PROBLEMS MADE IT FOR YOU TO DO YOUR WORK, TAKE CARE OF THINGS AT HOME, OR GET ALONG WITH OTHER PEOPLE: VERY DIFFICULT
SUM OF ALL RESPONSES TO PHQ QUESTIONS 1-9: 12

## 2024-07-15 NOTE — PROGRESS NOTES
"  Assessment & Plan   Problem List Items Addressed This Visit    None  Visit Diagnoses       Screening for HIV (human immunodeficiency virus)    -  Primary    Need for hepatitis C screening test        Attention deficit hyperactivity disorder (ADHD), combined type        Relevant Medications    amphetamine-dextroamphetamine (ADDERALL XR) 15 MG 24 hr capsule    amphetamine-dextroamphetamine (ADDERALL) 7.5 MG tablet    Right groin pain        Relevant Orders    Orthopedic  Referral           He has been on 3 weeks of adderall 10mg XR, but says it wears off by 2pm - will start 15mg XR, 7.5mg short acting in the afternoon. Follow up 3-4 weeks    See orders for chronic hip pain       BMI  Estimated body mass index is 35.73 kg/m  as calculated from the following:    Height as of this encounter: 1.778 m (5' 10\").    Weight as of this encounter: 112.9 kg (249 lb).       Dony Martinez is a 28 year old, presenting for the following health issues:  Results (ADHD Evaluation )        7/15/2024    11:34 AM   Additional Questions   Roomed by Divya HATHAWAY CMA     History of Present Illness       Reason for visit:  Adderall prescription post adhd assesment  Symptom onset:  More than a month  Symptoms include:  Adhd  Symptom intensity:  Severe  Symptom progression:  Worsening  Had these symptoms before:  Yes    He eats 0-1 servings of fruits and vegetables daily.He consumes 0 sweetened beverage(s) daily.He exercises with enough effort to increase his heart rate 9 or less minutes per day.  He exercises with enough effort to increase his heart rate 3 or less days per week.   He is taking medications regularly.     He saw Sharron Ley PsyD LP on 6/27/2024. Diagnosed with Mild Depression and ADHD, combined type.                    Objective    /89 (BP Location: Right arm, Patient Position: Chair, Cuff Size: Adult Large)   Pulse 92   Temp 98.5  F (36.9  C) (Temporal)   Resp 18   Ht 1.778 m (5' 10\")   Wt 112.9 " kg (249 lb)   SpO2 99%   BMI 35.73 kg/m    Body mass index is 35.73 kg/m .  Physical Exam   Flat affect  NAD  Tenderness at groin, right side inguinal line        Signed Electronically by: CHAR MALDONADO DO

## 2024-08-05 ENCOUNTER — OFFICE VISIT (OUTPATIENT)
Dept: FAMILY MEDICINE | Facility: CLINIC | Age: 29
End: 2024-08-05
Payer: COMMERCIAL

## 2024-08-05 VITALS
TEMPERATURE: 98.9 F | HEART RATE: 89 BPM | BODY MASS INDEX: 35.07 KG/M2 | RESPIRATION RATE: 16 BRPM | OXYGEN SATURATION: 99 % | DIASTOLIC BLOOD PRESSURE: 87 MMHG | WEIGHT: 245 LBS | HEIGHT: 70 IN | SYSTOLIC BLOOD PRESSURE: 127 MMHG

## 2024-08-05 DIAGNOSIS — F90.2 ATTENTION DEFICIT HYPERACTIVITY DISORDER (ADHD), COMBINED TYPE: ICD-10-CM

## 2024-08-05 DIAGNOSIS — Z00.00 ROUTINE GENERAL MEDICAL EXAMINATION AT A HEALTH CARE FACILITY: Primary | ICD-10-CM

## 2024-08-05 DIAGNOSIS — R73.09 ELEVATED GLUCOSE: ICD-10-CM

## 2024-08-05 LAB — HBA1C MFR BLD: 5.7 % (ref 0–5.6)

## 2024-08-05 PROCEDURE — 99214 OFFICE O/P EST MOD 30 MIN: CPT | Mod: 25 | Performed by: FAMILY MEDICINE

## 2024-08-05 PROCEDURE — 99395 PREV VISIT EST AGE 18-39: CPT | Performed by: FAMILY MEDICINE

## 2024-08-05 PROCEDURE — 36415 COLL VENOUS BLD VENIPUNCTURE: CPT | Performed by: FAMILY MEDICINE

## 2024-08-05 PROCEDURE — 83036 HEMOGLOBIN GLYCOSYLATED A1C: CPT | Performed by: FAMILY MEDICINE

## 2024-08-05 RX ORDER — DEXTROAMPHETAMINE SACCHARATE, AMPHETAMINE ASPARTATE MONOHYDRATE, DEXTROAMPHETAMINE SULFATE AND AMPHETAMINE SULFATE 3.75; 3.75; 3.75; 3.75 MG/1; MG/1; MG/1; MG/1
15 CAPSULE, EXTENDED RELEASE ORAL EVERY MORNING
Qty: 30 CAPSULE | Refills: 0 | Status: CANCELLED | OUTPATIENT
Start: 2024-08-05

## 2024-08-05 RX ORDER — DEXTROAMPHETAMINE SACCHARATE, AMPHETAMINE ASPARTATE, DEXTROAMPHETAMINE SULFATE AND AMPHETAMINE SULFATE 2.5; 2.5; 2.5; 2.5 MG/1; MG/1; MG/1; MG/1
10 TABLET ORAL DAILY
Qty: 30 TABLET | Refills: 0 | Status: SHIPPED | OUTPATIENT
Start: 2024-10-04 | End: 2024-10-04

## 2024-08-05 RX ORDER — DEXTROAMPHETAMINE SACCHARATE, AMPHETAMINE ASPARTATE MONOHYDRATE, DEXTROAMPHETAMINE SULFATE AND AMPHETAMINE SULFATE 5; 5; 5; 5 MG/1; MG/1; MG/1; MG/1
20 CAPSULE, EXTENDED RELEASE ORAL EVERY MORNING
Qty: 30 CAPSULE | Refills: 0 | Status: SHIPPED | OUTPATIENT
Start: 2024-10-04 | End: 2024-10-04

## 2024-08-05 RX ORDER — DEXTROAMPHETAMINE SACCHARATE, AMPHETAMINE ASPARTATE MONOHYDRATE, DEXTROAMPHETAMINE SULFATE AND AMPHETAMINE SULFATE 5; 5; 5; 5 MG/1; MG/1; MG/1; MG/1
20 CAPSULE, EXTENDED RELEASE ORAL EVERY MORNING
Qty: 30 CAPSULE | Refills: 0 | Status: SHIPPED | OUTPATIENT
Start: 2024-09-04 | End: 2024-10-04

## 2024-08-05 RX ORDER — DEXTROAMPHETAMINE SACCHARATE, AMPHETAMINE ASPARTATE MONOHYDRATE, DEXTROAMPHETAMINE SULFATE AND AMPHETAMINE SULFATE 5; 5; 5; 5 MG/1; MG/1; MG/1; MG/1
20 CAPSULE, EXTENDED RELEASE ORAL EVERY MORNING
Qty: 30 CAPSULE | Refills: 0 | Status: SHIPPED | OUTPATIENT
Start: 2024-08-05 | End: 2024-08-28

## 2024-08-05 RX ORDER — DEXTROAMPHETAMINE SACCHARATE, AMPHETAMINE ASPARTATE, DEXTROAMPHETAMINE SULFATE AND AMPHETAMINE SULFATE 2.5; 2.5; 2.5; 2.5 MG/1; MG/1; MG/1; MG/1
10 TABLET ORAL DAILY
Qty: 30 TABLET | Refills: 0 | Status: SHIPPED | OUTPATIENT
Start: 2024-08-05 | End: 2024-08-28

## 2024-08-05 RX ORDER — DEXTROAMPHETAMINE SACCHARATE, AMPHETAMINE ASPARTATE, DEXTROAMPHETAMINE SULFATE AND AMPHETAMINE SULFATE 1.875; 1.875; 1.875; 1.875 MG/1; MG/1; MG/1; MG/1
7.5 TABLET ORAL DAILY
Qty: 30 TABLET | Refills: 0 | Status: CANCELLED | OUTPATIENT
Start: 2024-08-05

## 2024-08-05 RX ORDER — DEXTROAMPHETAMINE SACCHARATE, AMPHETAMINE ASPARTATE, DEXTROAMPHETAMINE SULFATE AND AMPHETAMINE SULFATE 2.5; 2.5; 2.5; 2.5 MG/1; MG/1; MG/1; MG/1
10 TABLET ORAL DAILY
Qty: 30 TABLET | Refills: 0 | Status: SHIPPED | OUTPATIENT
Start: 2024-09-04 | End: 2024-10-04

## 2024-08-05 SDOH — HEALTH STABILITY: PHYSICAL HEALTH: ON AVERAGE, HOW MANY DAYS PER WEEK DO YOU ENGAGE IN MODERATE TO STRENUOUS EXERCISE (LIKE A BRISK WALK)?: 3 DAYS

## 2024-08-05 SDOH — HEALTH STABILITY: PHYSICAL HEALTH: ON AVERAGE, HOW MANY MINUTES DO YOU ENGAGE IN EXERCISE AT THIS LEVEL?: 30 MIN

## 2024-08-05 ASSESSMENT — SOCIAL DETERMINANTS OF HEALTH (SDOH): HOW OFTEN DO YOU GET TOGETHER WITH FRIENDS OR RELATIVES?: NEVER

## 2024-08-05 NOTE — PROGRESS NOTES
{PROVIDER CHARTING PREFERENCE:498857}    Dony Martinez is a 28 year old, presenting for the following health issues:  Recheck Medication      8/5/2024     4:48 PM   Additional Questions   Roomed by Divya HATHAWAY CMA     History of Present Illness       Reason for visit:  Medication dosage increase possible brand name switch and some questions i also would like blood tests done to keep monitoring sugar levels    He eats 0-1 servings of fruits and vegetables daily.He consumes 0 sweetened beverage(s) daily.He exercises with enough effort to increase his heart rate 20 to 29 minutes per day.  He exercises with enough effort to increase his heart rate 4 days per week. He is missing 1 dose(s) of medications per week.       {MA/LPN/RN Pre-Provider Visit Orders- hCG/UA/Strep (Optional):244835}  {SUPERLIST (Optional):265802}  {additonal problems for provider to add (Optional):407064}    {ROS Picklists (Optional):115710}      Objective    There were no vitals taken for this visit.  There is no height or weight on file to calculate BMI.  Physical Exam   {Exam List (Optional):313707}    {Diagnostic Test Results (Optional):798289}        Signed Electronically by: CHAR MALDONADO DO  {Email feedback regarding this note to [email or web address]   :183531}

## 2024-08-05 NOTE — PATIENT INSTRUCTIONS
"Patient Education   Consejos de atención preventiva  Se trata de consejos generales que solemos ofrecer para ayudar a las personas a mantenerse saludables. Lyles equipo de atención puede darle consejos específicos. Hable con ellos sobre tess propias necesidades de atención preventiva.  Estilo de tamiko  Ejercítese, harmony mínimo, 150 minutos a la semana (30 minutos al día, 5 días a la semana).  Realice actividades de fortalecimiento muscular 2 días a la semana, ya que contribuyen al control del peso y a la prevención de enfermedades.  No fume.  Use protector solar para prevenir el cáncer de piel.  Cada 2 a 5 años, realice pruebas de detección de radón en lyles hogar. Se trata de un gas incoloro e inodoro que puede dañar los pulmones. Para obtener más información, visite [email or web address] y busque \"Radon in Homes\" (Radón en los hogares).  Mantenga las juan descargadas y bajo llave en un lugar seguro, harmony lyla caja will o lyla cámara blindada, o use un candado para juan y esconda las llaves. Guarde siempre las balas por separado. Para obtener más información, visite [employer]gov y busque \"Safe Gun Storage\" (Almacenamiento seguro michael).  Alimentación  Consuma 5 o más porciones de frutas y verduras al día.  Pruebe el pan de cristal, el arroz integral y la pasta integral (en lugar de pan ibarra, arroz ibarra y pasta).  Consuma suficiente calcio y vitamina D. Revise la etiqueta de los alimentos y procure alcanzar el 100 % de la ingesta diaria recomendada (IDR).  Exámenes periódicos  Hágase un examen dental y lyal limpieza cada 6 meses.  Visite a lyles equipo de atención médica todos los años para hablar sobre lo siguiente:  Todo cambio en lyles jerrell.  Todo medicamento que lyles equipo de atención le haya recetado.  Atención preventiva, planificación familiar y formas de prevenir enfermedades crónicas.  Inyecciones (vacunas)   Vacunas contra el virus del papiloma humano (VPH) (hasta los 26 años), si nunca se las griffin colocado.  Vacunas " contra la hepatitis B (hasta los 59 años), si nunca se las griffin colocado.  Vacuna contra la COVID-19: vacúnese cuando corresponda.  Vacuna contra la gripe: vacúnese contra la gripe todos los años.  Vacuna contra el tétanos: vacúnese contra el tétanos cada 10 años.  Vacunas contra el neumococo, la hepatitis A y el virus sincicial respiratorio (VSR): pregunte a lyles equipo de atención si las necesita en función de lyles riesgo.  Vacuna contra el herpes zóster (para personas de 50 años o más).  Pruebas médicas generales  Examen de detección de diabetes:  A partir de los 35 años, hágase exámenes de detección de diabetes, harmony mínimo, cada 3 años.  Si tiene menos de 35 años, pregunte a lyles equipo de atención si debe hacerlo.  Prueba de colesterol: a los 39 años, comience a hacerse lyla prueba de colesterol cada 5 años o con mayor frecuencia si se lo aconsejan.  Exploración de densidad ósea (DEXA): a los 50 años, pregunte a lyles equipo de atención si debe hacerse esta exploración para detectar osteoporosis (fragilidad en los huesos).  Hepatitis C: hágase la prueba, harmony mínimo, lyla vez en la tamiko.  Examen de detección de aneurismas aórticos abdominales: hable con lyles médico sobre la posibilidad de hacerse janelle examen de detección si cumple alguna de las siguientes condiciones:  fumó alguna vez;  y  es hombre según lyles sexo biológico;  y  tiene entre 65 y 75 años.  Infecciones de transmisión sexual (ITS)  Antes de los 24 años, pregunte a lyles equipo de atención si debe hacerse exámenes de detección de ITS.  Después de los 24 años, hágase exámenes de detección de ITS si está en riesgo. Está en riesgo de contraer alguna ITS (incluido el virus de la inmunodeficiencia adquirida [VIH]) en los siguientes casos:  Tiene relaciones sexuales con más de lyla persona.  No usa preservativos siempre que tiene relaciones sexuales.  A usted o a lyles otilia le diagnosticaron lyla infección de transmisión sexual.  Si está en riesgo de contraer el VIH,  consulte sobre los medicamentos de la profilaxis de preexposición (Pre-Exposure Prophylaxis, PrEP) para prevenirlo.  Hágase la prueba del VIH, harmony mínimo, lyla vez en la tamiko, tanto si está en riesgo de contraer el virus harmony si no.  Pruebas de detección de cáncer  Examen de detección de cáncer de rakesh uterino: si tiene rakesh uterino, comience a hacerse pruebas periódicas de detección de cáncer de rakesh uterino a los 21 años. La mayoría de las personas que se hacen exámenes periódicos de detección y obtienen resultados normales pueden dejar de hacerlo a partir de los 65 años. Hable sobre esto con lyles proveedor.  Exploración de detección de cáncer de mama (mamografía): si alguna vez ha tenido mamas, comience a hacerse mamografías periódicas a partir de los 40 años. Se trata de lyla exploración para detectar el cáncer de mama.  Examen de detección de cáncer de colon: es importante comenzar a hacerse los exámenes de detección de cáncer de colon a los 45 años.  Hágase lyla colonoscopía cada 10 años (o con más frecuencia si está en riesgo) O pregunte a lyles proveedor sobre pruebas de heces, harmony la prueba inmunoquímica fecal (Fecal Immunochemical Test, FIT) cada año o la prueba Cologuard cada 3 años.  Para obtener más información sobre las opciones de las pruebas que tiene a disposición, visite: www.fvfiles.com/216895bh.  Si necesita ayuda para karina lyla decisión, visite: [email or web address].  Prueba de detección de cáncer de próstata: si tiene próstata y está entre los 55 y 69 años, pregunte a lyles proveedor si le convendría hacerse lyla prueba anual de detección de cáncer de próstata.  Examen de detección de cáncer de pulmón: si fuma o fumó y tiene entre 50 y 80 años, pregunte a lyles equipo de atención si los exámenes continuos de detección de cáncer de pulmón son adecuados para usted.    Solo para uso con fines informativos. Roslyn documento no pretende sustituir ninguna recomendación médica. Derechos de autor   2023 [facility].   Todos los derechos reservados. Revisión clínica a [facility] de [facility] Transitions Program. [employer] [id number] - REV 04/24.

## 2024-08-05 NOTE — PROGRESS NOTES
"Preventive Care Visit  [facility]  CHAR MALDONADO DO, Family Medicine  Aug 5, 2024      Assessment & Plan   Problem List Items Addressed This Visit    None  Visit Diagnoses       Routine general medical examination at a health care facility    -  Primary    Attention deficit hyperactivity disorder (ADHD), combined type        Relevant Medications    amphetamine-dextroamphetamine (ADDERALL XR) 20 MG 24 hr capsule    amphetamine-dextroamphetamine (ADDERALL XR) 20 MG 24 hr capsule (Start on 9/4/2024)    amphetamine-dextroamphetamine (ADDERALL XR) 20 MG 24 hr capsule (Start on 10/4/2024)    amphetamine-dextroamphetamine (ADDERALL) 10 MG tablet    amphetamine-dextroamphetamine (ADDERALL) 10 MG tablet (Start on 9/4/2024)    amphetamine-dextroamphetamine (ADDERALL) 10 MG tablet (Start on 10/4/2024)    Elevated glucose        Relevant Orders    Hemoglobin A1c           Follow up 3 months at increased adderall dose  Patient has been advised of split billing requirements and indicates understanding: Yes       BMI  Estimated body mass index is 35.15 kg/m  as calculated from the following:    Height as of this encounter: 1.778 m (5' 10\").    Weight as of this encounter: 111.1 kg (245 lb).       Counseling  Appropriate preventive services were addressed with this patient via screening, questionnaire, or discussion as appropriate for fall prevention, nutrition, physical activity, Tobacco-use cessation, weight loss and cognition.  Checklist reviewing preventive services available has been given to the patient.  Reviewed patient's diet, addressing concerns and/or questions.   He is at risk for lack of exercise and has been provided with information to increase physical activity for the benefit of his well-being.   Patient is at risk for social isolation and has been provided with information about the benefit of social connection.   He is at risk for psychosocial distress and has been provided with " "information to reduce risk.           Dony Martinez is a 28 year old, presenting for the following:  Recheck Medication and Physical        8/5/2024     4:48 PM   Additional Questions   Roomed by Divya HATHAWAY CMA        Health Care Directive  Patient does not have a Health Care Directive or Living Will: Discussed advance care planning with patient; information given to patient to review.    Healthy Habits:     Taking medications regularly:  1  History of Present Illness       Reason for visit:  Medication dosage increase possible brand name switch and some questions i also would like blood tests done to keep monitoring sugar levels    He eats 0-1 servings of fruits and vegetables daily.He consumes 0 sweetened beverage(s) daily.He exercises with enough effort to increase his heart rate 20 to 29 minutes per day.  He exercises with enough effort to increase his heart rate 4 days per week. He is missing 1 dose(s) of medications per week.    \"I'm close to what I believe is a therapeutic dose, but focus isn't there for the whole time\"  He would like to try brand name Adderall      8/5/2024   General Health   How would you rate your overall physical health? (!) FAIR   Feel stress (tense, anxious, or unable to sleep) Only a little      (!) STRESS CONCERN      8/5/2024   Nutrition   Three or more servings of calcium each day? Yes   Diet: Regular (no restrictions)   How many servings of fruit and vegetables per day? (!) 0-1   How many sweetened beverages each day? 0-1            8/5/2024   Exercise   Days per week of moderate/strenous exercise 3 days   Average minutes spent exercising at this level 30 min            8/5/2024   Social Factors   Frequency of gathering with friends or relatives Never   Worry food won't last until get money to buy more No   Food not last or not have enough money for food? No   Do you have housing? (Housing is defined as stable permanent housing and does not include staying ouside in a car, in " "a tent, in an abandoned building, in an overnight shelter, or couch-surfing.) Yes   Are you worried about losing your housing? No   Lack of transportation? No   Unable to get utilities (heat,electricity)? No      (!) SOCIAL CONNECTIONS CONCERN      8/5/2024   Dental   Dentist two times every year? Yes            8/5/2024   TB Screening   Were you born outside of the [place]? Yes                    8/5/2024   Substance Use   Alcohol more than 3/day or more than 7/wk No   Do you use any other substances recreationally? No        Social History     Tobacco Use    Smoking status: Every Day     Types: Vaping Device     Passive exposure: Current    Smokeless tobacco: Never   Vaping Use    Vaping status: Every Day    Substances: Nicotine, Flavoring    Devices: Disposable, Pre-filled or refillable cartridge, Refillable tank, Pre-filled pod    Passive vaping exposure: Yes   Substance Use Topics    Alcohol use: No    Drug use: No     Types: Marijuana     Comment: 3x a week             8/5/2024   One time HIV Screening   Previous HIV test? Yes          8/5/2024   STI Screening   New sexual partner(s) since last STI/HIV test? No            8/5/2024   Contraception/Family Planning   Questions about contraception or family planning No           Reviewed and updated as needed this visit by Provider                       Objective    Exam  /87 (BP Location: Right arm, Patient Position: Chair, Cuff Size: Adult Large)   Pulse 89   Temp 98.9  F (37.2  C) (Temporal)   Resp 16   Ht 1.778 m (5' 10\")   Wt 111.1 kg (245 lb)   SpO2 99%   BMI 35.15 kg/m     Estimated body mass index is 35.15 kg/m  as calculated from the following:    Height as of this encounter: 1.778 m (5' 10\").    Weight as of this encounter: 111.1 kg (245 lb).    Physical Exam  GENERAL: alert and no distress  NECK: no adenopathy, no asymmetry, masses, or scars  RESP: lungs clear to auscultation - no rales, rhonchi or wheezes  CV: regular rate and rhythm, normal S1 " S2, no S3 or S4, no murmur, click or rub, no peripheral edema  ABDOMEN: soft, nontender, no hepatosplenomegaly, no masses and bowel sounds normal  MS: no gross musculoskeletal defects noted, no edema        Signed Electronically by: CHAR MALDONADO DO

## 2024-08-28 ENCOUNTER — MYC REFILL (OUTPATIENT)
Dept: FAMILY MEDICINE | Facility: CLINIC | Age: 29
End: 2024-08-28
Payer: COMMERCIAL

## 2024-08-28 ENCOUNTER — TELEPHONE (OUTPATIENT)
Dept: FAMILY MEDICINE | Facility: CLINIC | Age: 29
End: 2024-08-28

## 2024-08-28 DIAGNOSIS — F90.2 ATTENTION DEFICIT HYPERACTIVITY DISORDER (ADHD), COMBINED TYPE: ICD-10-CM

## 2024-08-28 RX ORDER — DEXTROAMPHETAMINE SACCHARATE, AMPHETAMINE ASPARTATE MONOHYDRATE, DEXTROAMPHETAMINE SULFATE AND AMPHETAMINE SULFATE 5; 5; 5; 5 MG/1; MG/1; MG/1; MG/1
20 CAPSULE, EXTENDED RELEASE ORAL EVERY MORNING
Qty: 30 CAPSULE | Refills: 0 | Status: SHIPPED | OUTPATIENT
Start: 2024-08-28

## 2024-08-28 RX ORDER — DEXTROAMPHETAMINE SACCHARATE, AMPHETAMINE ASPARTATE, DEXTROAMPHETAMINE SULFATE AND AMPHETAMINE SULFATE 2.5; 2.5; 2.5; 2.5 MG/1; MG/1; MG/1; MG/1
10 TABLET ORAL DAILY
Qty: 30 TABLET | Refills: 0 | Status: SHIPPED | OUTPATIENT
Start: 2024-08-28

## 2024-08-28 NOTE — TELEPHONE ENCOUNTER
Pt calling as he is concerned that the adderall is raising blood sugars. Pt's recent A1C was 5.7 and questions if a blood sugar monitor would be a good option on top of home care such as diet.     Pt stated he would like to know if pcp recommends checking blood sugar regularly as he is prediabetic.     Mayte Brandt RN on 8/28/2024 at 5:21 PM

## 2024-08-28 NOTE — TELEPHONE ENCOUNTER
Last filled around 8/5/24. Pt calling to confirm upcoming script was sent to pharmacy in [place] and the [facility] still at [place]. No further questions from pt with refills.     Mayte Brandt RN on 8/28/2024 at 4:56 PM

## 2024-08-29 NOTE — TELEPHONE ENCOUNTER
I called the patient using a . LVM for patient to give the clinic a call back. Okay to work in to Dr. Cortes's Schedule either 08/29/2024 at 4:20pm arrival or 08/30/2024 at 1120am arrival, 120pm arrival or 320pm arrival.Okayed by Divya Jones [facility]

## 2024-08-30 ENCOUNTER — VIRTUAL VISIT (OUTPATIENT)
Dept: FAMILY MEDICINE | Facility: CLINIC | Age: 29
End: 2024-08-30
Payer: COMMERCIAL

## 2024-08-30 DIAGNOSIS — F90.2 ATTENTION DEFICIT HYPERACTIVITY DISORDER (ADHD), COMBINED TYPE: ICD-10-CM

## 2024-08-30 DIAGNOSIS — R73.03 PREDIABETES: Primary | ICD-10-CM

## 2024-08-30 PROCEDURE — 99441 PR PHYSICIAN TELEPHONE EVALUATION 5-10 MIN: CPT | Mod: 93 | Performed by: FAMILY MEDICINE

## 2024-08-30 NOTE — TELEPHONE ENCOUNTER
I tired calling the patient again. LVM for him to call the clinic back to get worked in today to see Dr. Cortes. Will sent patient a Mirak[employer] message as well.    Divya Jones [facility]

## 2024-08-30 NOTE — PROGRESS NOTES
"Juan is a 28 year old who is being evaluated via a billable telephone visit.    What phone number would you like to be contacted at? [phone]  How would you like to obtain your AVS? Anjali  Originating Location (pt. Location): Home    Distant Location (provider location):  On-site    Assessment & Plan   Problem List Items Addressed This Visit    None  Visit Diagnoses       Prediabetes    -  Primary    Relevant Orders    Adult Diabetes Education  Referral           He really wants to do regular blood glucose fingerstick testing but I advised that that is unnecessary with a hemoglobin A1c 5.7 and no history of diabetes.  I did ask diabetic educator to discuss his prediabetes with him to help answer questions and expectations.       BMI  Estimated body mass index is 35.15 kg/m  as calculated from the following:    Height as of 8/5/24: 1.778 m (5' 10\").    Weight as of 8/5/24: 111.1 kg (245 lb).             Subjective   Juan is a 28 year old, presenting for the following health issues:  Diabetes (Questions about blood sugars)      8/30/2024     2:43 PM   Additional Questions   Roomed by Divya HATHAWAY CMA     History of Present Illness       Reason for visit:  Questions about blood sugars   He is taking medications regularly.     He would like a prescription for soft click lancets and diabetes testing supplies.  His most recent A1c was 5.7%      Objective           Vitals:  No vitals were obtained today due to virtual visit.    Physical Exam   General: Alert and no distress //Respiratory: No audible wheeze, cough, or shortness of breath // Psychiatric:  Appropriate affect, tone, and pace of words      Office Visit on 08/05/2024   Component Date Value Ref Range Status    Hemoglobin A1C 08/05/2024 5.7 (H)  0.0 - 5.6 % Final    Normal <5.7%   Prediabetes 5.7-6.4%    Diabetes 6.5% or higher     Note: Adopted from ADA consensus guidelines.         Phone call duration: 8 minutes  Signed Electronically by: CHAR GONSALEZ" Routed to pulmonology to answer questions on medications and covid test.      Advised patient questions would be best answered by prescribing office.    ERICA, DO

## 2024-10-04 RX ORDER — DEXTROAMPHETAMINE SACCHARATE, AMPHETAMINE ASPARTATE, DEXTROAMPHETAMINE SULFATE AND AMPHETAMINE SULFATE 2.5; 2.5; 2.5; 2.5 MG/1; MG/1; MG/1; MG/1
10 TABLET ORAL DAILY
Qty: 30 TABLET | Refills: 0 | Status: SHIPPED | OUTPATIENT
Start: 2024-10-04

## 2024-10-04 RX ORDER — DEXTROAMPHETAMINE SACCHARATE, AMPHETAMINE ASPARTATE MONOHYDRATE, DEXTROAMPHETAMINE SULFATE AND AMPHETAMINE SULFATE 5; 5; 5; 5 MG/1; MG/1; MG/1; MG/1
20 CAPSULE, EXTENDED RELEASE ORAL EVERY MORNING
Qty: 30 CAPSULE | Refills: 0 | Status: SHIPPED | OUTPATIENT
Start: 2024-10-04

## 2024-10-14 ENCOUNTER — OFFICE VISIT (OUTPATIENT)
Dept: FAMILY MEDICINE | Facility: CLINIC | Age: 29
End: 2024-10-14
Payer: COMMERCIAL

## 2024-10-14 VITALS
SYSTOLIC BLOOD PRESSURE: 132 MMHG | HEART RATE: 87 BPM | OXYGEN SATURATION: 98 % | BODY MASS INDEX: 35.5 KG/M2 | RESPIRATION RATE: 16 BRPM | TEMPERATURE: 98.9 F | DIASTOLIC BLOOD PRESSURE: 85 MMHG | WEIGHT: 248 LBS | HEIGHT: 70 IN

## 2024-10-14 DIAGNOSIS — F90.2 ATTENTION DEFICIT HYPERACTIVITY DISORDER (ADHD), COMBINED TYPE: Primary | ICD-10-CM

## 2024-10-14 PROCEDURE — 99214 OFFICE O/P EST MOD 30 MIN: CPT | Mod: 25 | Performed by: FAMILY MEDICINE

## 2024-10-14 PROCEDURE — 90480 ADMN SARSCOV2 VAC 1/ONLY CMP: CPT | Performed by: FAMILY MEDICINE

## 2024-10-14 PROCEDURE — 90471 IMMUNIZATION ADMIN: CPT | Performed by: FAMILY MEDICINE

## 2024-10-14 PROCEDURE — 90656 IIV3 VACC NO PRSV 0.5 ML IM: CPT | Performed by: FAMILY MEDICINE

## 2024-10-14 PROCEDURE — 91320 SARSCV2 VAC 30MCG TRS-SUC IM: CPT | Performed by: FAMILY MEDICINE

## 2024-10-14 RX ORDER — DEXTROAMPHETAMINE SACCHARATE, AMPHETAMINE ASPARTATE, DEXTROAMPHETAMINE SULFATE AND AMPHETAMINE SULFATE 3.125; 3.125; 3.125; 3.125 MG/1; MG/1; MG/1; MG/1
12.5 TABLET ORAL DAILY
Qty: 30 TABLET | Refills: 0 | Status: SHIPPED | OUTPATIENT
Start: 2024-11-14 | End: 2024-12-14

## 2024-10-14 RX ORDER — DEXTROAMPHETAMINE SACCHARATE, AMPHETAMINE ASPARTATE, DEXTROAMPHETAMINE SULFATE AND AMPHETAMINE SULFATE 3.125; 3.125; 3.125; 3.125 MG/1; MG/1; MG/1; MG/1
12.5 TABLET ORAL
Qty: 30 TABLET | Refills: 0 | Status: SHIPPED | OUTPATIENT
Start: 2024-10-14

## 2024-10-14 RX ORDER — DEXTROAMPHETAMINE SACCHARATE, AMPHETAMINE ASPARTATE MONOHYDRATE, DEXTROAMPHETAMINE SULFATE AND AMPHETAMINE SULFATE 6.25; 6.25; 6.25; 6.25 MG/1; MG/1; MG/1; MG/1
25 CAPSULE, EXTENDED RELEASE ORAL DAILY
Qty: 30 CAPSULE | Refills: 0 | Status: SHIPPED | OUTPATIENT
Start: 2024-11-13 | End: 2024-12-13

## 2024-10-14 RX ORDER — DEXTROAMPHETAMINE SACCHARATE, AMPHETAMINE ASPARTATE MONOHYDRATE, DEXTROAMPHETAMINE SULFATE AND AMPHETAMINE SULFATE 6.25; 6.25; 6.25; 6.25 MG/1; MG/1; MG/1; MG/1
25 CAPSULE, EXTENDED RELEASE ORAL DAILY
Qty: 30 CAPSULE | Refills: 0 | Status: SHIPPED | OUTPATIENT
Start: 2024-12-13 | End: 2025-01-12

## 2024-10-14 RX ORDER — DEXTROAMPHETAMINE SACCHARATE, AMPHETAMINE ASPARTATE, DEXTROAMPHETAMINE SULFATE AND AMPHETAMINE SULFATE 3.125; 3.125; 3.125; 3.125 MG/1; MG/1; MG/1; MG/1
12.5 TABLET ORAL DAILY
Qty: 30 TABLET | Refills: 0 | Status: SHIPPED | OUTPATIENT
Start: 2024-12-14 | End: 2025-01-13

## 2024-10-14 RX ORDER — DEXTROAMPHETAMINE SACCHARATE, AMPHETAMINE ASPARTATE MONOHYDRATE, DEXTROAMPHETAMINE SULFATE AND AMPHETAMINE SULFATE 6.25; 6.25; 6.25; 6.25 MG/1; MG/1; MG/1; MG/1
25 CAPSULE, EXTENDED RELEASE ORAL DAILY
Qty: 30 CAPSULE | Refills: 0 | Status: SHIPPED | OUTPATIENT
Start: 2024-10-14 | End: 2024-11-13

## 2024-10-14 ASSESSMENT — PATIENT HEALTH QUESTIONNAIRE - PHQ9: SUM OF ALL RESPONSES TO PHQ QUESTIONS 1-9: 2

## 2024-10-14 NOTE — PROGRESS NOTES
"Assessment & Plan   Problem List Items Addressed This Visit    None  Visit Diagnoses       Attention deficit hyperactivity disorder (ADHD), combined type    -  Primary    Relevant Medications    amphetamine-dextroamphetamine (ADDERALL XR) 25 MG 24 hr capsule    amphetamine-dextroamphetamine (ADDERALL XR) 25 MG 24 hr capsule (Start on 11/13/2024)    amphetamine-dextroamphetamine (ADDERALL XR) 25 MG 24 hr capsule (Start on 12/13/2024)    amphetamine-dextroamphetamine (ADDERALL) 12.5 MG tablet    amphetamine-dextroamphetamine (ADDERALL) 12.5 MG tablet (Start on 11/14/2024)    amphetamine-dextroamphetamine (ADDERALL) 12.5 MG tablet (Start on 12/14/2024)           Increase from 20+10 to 25+12.5 Adderall  Follow up 3 months     BMI  Estimated body mass index is 35.58 kg/m  as calculated from the following:    Height as of this encounter: 1.778 m (5' 10\").    Weight as of this encounter: 112.5 kg (248 lb).     Dony Martinez is a 29 year old, presenting for the following health issues:  A.D.H.D        10/14/2024     3:08 PM   Additional Questions   Roomed by Guillermina JAMA     History of Present Illness       Reason for visit:  Prescription appointment    He eats 0-1 servings of fruits and vegetables daily.He consumes 0 sweetened beverage(s) daily.He exercises with enough effort to increase his heart rate 20 to 29 minutes per day.  He exercises with enough effort to increase his heart rate 4 days per week.   He is taking medications regularly.     Wants to increase dose of Adderall      Objective    /85 (BP Location: Right arm, Patient Position: Sitting, Cuff Size: Adult Large)   Pulse 87   Temp 98.9  F (37.2  C) (Temporal)   Resp 16   Ht 1.778 m (5' 10\")   Wt 112.5 kg (248 lb)   SpO2 98%   BMI 35.58 kg/m    Body mass index is 35.58 kg/m .  Physical Exam   Gen NAD  Flat affect            Signed Electronically by: CHAR MALDONADO, DO    "

## 2024-12-30 ENCOUNTER — OFFICE VISIT (OUTPATIENT)
Dept: FAMILY MEDICINE | Facility: CLINIC | Age: 29
End: 2024-12-30
Payer: COMMERCIAL

## 2024-12-30 VITALS
OXYGEN SATURATION: 98 % | WEIGHT: 245 LBS | BODY MASS INDEX: 35.07 KG/M2 | HEART RATE: 98 BPM | DIASTOLIC BLOOD PRESSURE: 96 MMHG | TEMPERATURE: 98.4 F | SYSTOLIC BLOOD PRESSURE: 141 MMHG | HEIGHT: 70 IN | RESPIRATION RATE: 16 BRPM

## 2024-12-30 DIAGNOSIS — F90.2 ATTENTION DEFICIT HYPERACTIVITY DISORDER (ADHD), COMBINED TYPE: Primary | ICD-10-CM

## 2024-12-30 DIAGNOSIS — I10 BENIGN ESSENTIAL HYPERTENSION: ICD-10-CM

## 2024-12-30 DIAGNOSIS — N18.2 CHRONIC KIDNEY DISEASE, STAGE 2 (MILD): ICD-10-CM

## 2024-12-30 PROCEDURE — 99214 OFFICE O/P EST MOD 30 MIN: CPT | Performed by: FAMILY MEDICINE

## 2024-12-30 RX ORDER — DEXTROAMPHETAMINE SACCHARATE, AMPHETAMINE ASPARTATE, DEXTROAMPHETAMINE SULFATE AND AMPHETAMINE SULFATE 3.75; 3.75; 3.75; 3.75 MG/1; MG/1; MG/1; MG/1
15 TABLET ORAL 3 TIMES DAILY
Qty: 90 TABLET | Refills: 0 | Status: SHIPPED | OUTPATIENT
Start: 2024-12-30 | End: 2025-01-29

## 2024-12-30 RX ORDER — DEXTROAMPHETAMINE SACCHARATE, AMPHETAMINE ASPARTATE, DEXTROAMPHETAMINE SULFATE AND AMPHETAMINE SULFATE 3.75; 3.75; 3.75; 3.75 MG/1; MG/1; MG/1; MG/1
15 TABLET ORAL 3 TIMES DAILY
Qty: 90 TABLET | Refills: 0 | Status: SHIPPED | OUTPATIENT
Start: 2025-02-28 | End: 2025-03-30

## 2024-12-30 RX ORDER — LISINOPRIL 10 MG/1
10 TABLET ORAL DAILY
Qty: 90 TABLET | Refills: 3 | Status: SHIPPED | OUTPATIENT
Start: 2024-12-30

## 2024-12-30 RX ORDER — DEXTROAMPHETAMINE SACCHARATE, AMPHETAMINE ASPARTATE, DEXTROAMPHETAMINE SULFATE AND AMPHETAMINE SULFATE 3.75; 3.75; 3.75; 3.75 MG/1; MG/1; MG/1; MG/1
15 TABLET ORAL 3 TIMES DAILY
Qty: 90 TABLET | Refills: 0 | Status: SHIPPED | OUTPATIENT
Start: 2025-01-29 | End: 2025-02-28

## 2024-12-30 NOTE — PROGRESS NOTES
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    Weight as of this encounter: 111.1 kg (245 lb).           Dony Martinez is a 29 year old, presenting for the following health issues:  A.D.H.D (Follow up ), Blood Draw, Hypertension (High BP with Adderall ), kicking E-Cig and Caffeine , and heart and lungs checked        12/30/2024     3:33 PM   Additional Questions   Roomed by Divya HATHAWAY CMA     History of Present Illness       Reason for visit:  Re evaluate adhd meds/ Blood work/ insight on kicking caffeine and ecig for good they cause high blood pressure together with adderall. i would also like my heart checked and lungs    He eats 0-1 servings of fruits and vegetables daily.He consumes 1 sweetened beverage(s) daily.He exercises with enough effort to increase his heart rate 9 or less minutes per day.  He exercises with enough effort to increase his heart rate 3 or less days per week.   He is taking medications regularly.    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  Physical Exam   GENERAL: alert and no distress  CV: regular rate and rhythm, normal S1 S2, no S3 or S4, no murmur, click or rub, no peripheral edema    Office Visit on 08/05/2024   Component Date Value Ref Range Status    Hemoglobin A1C 08/05/2024 5.7 (H)  0.0 - 5.6 % Final    Normal <5.7%   Prediabetes 5.7-6.4%    Diabetes 6.5% or higher     Note: Adopted from ADA consensus guidelines.   eGFR 86        Signed Electronically by: CHAR MALDONADO DO    "

## 2025-01-06 ENCOUNTER — DOCUMENTATION ONLY (OUTPATIENT)
Dept: FAMILY MEDICINE | Facility: CLINIC | Age: 30
End: 2025-01-06

## 2025-01-06 ENCOUNTER — LAB (OUTPATIENT)
Dept: LAB | Facility: CLINIC | Age: 30
End: 2025-01-06
Payer: COMMERCIAL

## 2025-01-06 DIAGNOSIS — N18.2 CHRONIC KIDNEY DISEASE, STAGE 2 (MILD): ICD-10-CM

## 2025-01-06 DIAGNOSIS — I10 BENIGN ESSENTIAL HYPERTENSION: ICD-10-CM

## 2025-01-06 PROCEDURE — 80048 BASIC METABOLIC PNL TOTAL CA: CPT

## 2025-01-06 PROCEDURE — 36415 COLL VENOUS BLD VENIPUNCTURE: CPT

## 2025-01-07 LAB
ANION GAP SERPL CALCULATED.3IONS-SCNC: 13 MMOL/L (ref 7–15)
BUN SERPL-MCNC: 13.5 MG/DL (ref 6–20)
CALCIUM SERPL-MCNC: 8.9 MG/DL (ref 8.8–10.4)
CHLORIDE SERPL-SCNC: 104 MMOL/L (ref 98–107)
CREAT SERPL-MCNC: 1.12 MG/DL (ref 0.67–1.17)
EGFRCR SERPLBLD CKD-EPI 2021: >90 ML/MIN/1.73M2
GLUCOSE SERPL-MCNC: 102 MG/DL (ref 70–99)
HCO3 SERPL-SCNC: 21 MMOL/L (ref 22–29)
POTASSIUM SERPL-SCNC: 4.1 MMOL/L (ref 3.4–5.3)
SODIUM SERPL-SCNC: 138 MMOL/L (ref 135–145)

## 2025-01-07 NOTE — PROGRESS NOTES
Called patient. Left detailed voice message on identified voicemail box regarding provider's message, advised to return call at [phone] for any further questions. Advised MyChart was also sent with update.    RODNEY ChenN KENDALL  [facility]

## 2025-01-29 ENCOUNTER — MYC REFILL (OUTPATIENT)
Dept: FAMILY MEDICINE | Facility: CLINIC | Age: 30
End: 2025-01-29
Payer: COMMERCIAL

## 2025-01-29 DIAGNOSIS — F90.2 ATTENTION DEFICIT HYPERACTIVITY DISORDER (ADHD), COMBINED TYPE: ICD-10-CM

## 2025-01-30 RX ORDER — DEXTROAMPHETAMINE SACCHARATE, AMPHETAMINE ASPARTATE, DEXTROAMPHETAMINE SULFATE AND AMPHETAMINE SULFATE 3.75; 3.75; 3.75; 3.75 MG/1; MG/1; MG/1; MG/1
15 TABLET ORAL 3 TIMES DAILY
Qty: 90 TABLET | Refills: 0 | Status: SHIPPED | OUTPATIENT
Start: 2025-01-30

## 2025-02-21 PROBLEM — F19.21: Status: RESOLVED | Noted: 2023-07-10 | Resolved: 2025-02-21

## 2025-03-26 ENCOUNTER — TELEPHONE (OUTPATIENT)
Dept: FAMILY MEDICINE | Facility: CLINIC | Age: 30
End: 2025-03-26
Payer: COMMERCIAL

## 2025-03-26 NOTE — TELEPHONE ENCOUNTER
I tried calling the patient using a . LVM for patient letting him know that paper prescriptions are ready for him for  at the [facility] at the . If he has any questions or concerns to please call the clinic back at [phone].    Divya Jones [facility]

## 2025-04-14 ENCOUNTER — OFFICE VISIT (OUTPATIENT)
Dept: FAMILY MEDICINE | Facility: CLINIC | Age: 30
End: 2025-04-14
Payer: COMMERCIAL

## 2025-04-14 VITALS
SYSTOLIC BLOOD PRESSURE: 130 MMHG | RESPIRATION RATE: 18 BRPM | OXYGEN SATURATION: 100 % | WEIGHT: 238 LBS | DIASTOLIC BLOOD PRESSURE: 80 MMHG | HEIGHT: 70 IN | BODY MASS INDEX: 34.07 KG/M2 | HEART RATE: 96 BPM | TEMPERATURE: 98.6 F

## 2025-04-14 DIAGNOSIS — R73.03 PREDIABETES: ICD-10-CM

## 2025-04-14 DIAGNOSIS — L30.9 ECZEMA, UNSPECIFIED TYPE: ICD-10-CM

## 2025-04-14 DIAGNOSIS — F90.2 ATTENTION DEFICIT HYPERACTIVITY DISORDER (ADHD), COMBINED TYPE: ICD-10-CM

## 2025-04-14 DIAGNOSIS — Z13.88 SCREENING FOR LEAD EXPOSURE: Primary | ICD-10-CM

## 2025-04-14 DIAGNOSIS — I10 BENIGN ESSENTIAL HYPERTENSION: ICD-10-CM

## 2025-04-14 PROCEDURE — 99214 OFFICE O/P EST MOD 30 MIN: CPT | Performed by: FAMILY MEDICINE

## 2025-04-14 PROCEDURE — 3079F DIAST BP 80-89 MM HG: CPT | Performed by: FAMILY MEDICINE

## 2025-04-14 PROCEDURE — 3075F SYST BP GE 130 - 139MM HG: CPT | Performed by: FAMILY MEDICINE

## 2025-04-14 RX ORDER — TRIAMCINOLONE ACETONIDE 1 MG/G
CREAM TOPICAL DAILY
Qty: 45 G | Refills: 5 | Status: SHIPPED | OUTPATIENT
Start: 2025-04-14

## 2025-04-14 RX ORDER — LISDEXAMFETAMINE DIMESYLATE 60 MG/1
60 CAPSULE ORAL DAILY
Qty: 30 CAPSULE | Refills: 0 | Status: SHIPPED | OUTPATIENT
Start: 2025-04-14 | End: 2025-04-15

## 2025-04-14 ASSESSMENT — PATIENT HEALTH QUESTIONNAIRE - PHQ9
SUM OF ALL RESPONSES TO PHQ QUESTIONS 1-9: 7
10. IF YOU CHECKED OFF ANY PROBLEMS, HOW DIFFICULT HAVE THESE PROBLEMS MADE IT FOR YOU TO DO YOUR WORK, TAKE CARE OF THINGS AT HOME, OR GET ALONG WITH OTHER PEOPLE: SOMEWHAT DIFFICULT
SUM OF ALL RESPONSES TO PHQ QUESTIONS 1-9: 7

## 2025-04-14 NOTE — LETTER
My Depression Action Plan  Name: Juan Lay   Date of Birth 1995  Date: 4/14/2025    My doctor: Adryan Cortes   My clinic: [address] [id number] [phone]            GREEN    ZONE   Good Control    What it looks like:   Things are going generally well. You have normal ups and downs. You may even feel depressed from time to time, but bad moods usually last less than a day.   What you need to do:  Continue to care for yourself (see self care plan)  Check your depression survival kit and update it as needed  Follow your physician s recommendations including any medication.  Do not stop taking medication unless you consult with your physician first.             YELLOW         ZONE Getting Worse    What it looks like:   Depression is starting to interfere with your life.   It may be hard to get out of bed; you may be starting to isolate yourself from others.  Symptoms of depression are starting to last most all day and this has happened for several days.   You may have suicidal thoughts but they are not constant.   What you need to do:     Call your care team. Your response to treatment will improve if you keep your care team informed of your progress. Yellow periods are signs an adjustment may need to be made.     Continue your self-care.  Just get dressed and ready for the day.  Don't give yourself time to talk yourself out of it.    Talk to someone in your support network.    Open up your Depression Self-Care Plan/Wellness Kit.             RED    ZONE Medical Alert - Get Help    What it looks like:   Depression is seriously interfering with your life.   You may experience these or other symptoms: You can t get out of bed most days, can t work or engage in other necessary activities, you have trouble taking care of basic hygiene, or basic responsibilities, thoughts of suicide or death that will not go away,  self-injurious behavior.     What you need to do:  Call your care team and request a same-day appointment. If they are not available (weekends or after hours) call your local crisis line, emergency room or 911.          Depression Self-Care Plan / Wellness Kit    Many people find that medication and therapy are helpful treatments for managing depression. In addition, making small changes to your everyday life can help to boost your mood and improve your wellbeing. Below are some tips for you to consider. Be sure to talk with your medical provider and/or behavioral health consultant if your symptoms are worsening or not improving.     Sleep   Sleep hygiene  means all of the habits that support good, restful sleep. It includes maintaining a consistent bedtime and wake time, using your bedroom only for sleeping or sex, and keeping the bedroom dark and free of distractions like a computer, smartphone, or television.     Develop a Healthy Routine  Maintain good hygiene. Get out of bed in the morning, make your bed, brush your teeth, take a shower, and get dressed. Don t spend too much time viewing media that makes you feel stressed. Find time to relax each day.    Exercise  Get some form of exercise every day. This will help reduce pain and release endorphins, the  feel good  chemicals in your brain. It can be as simple as just going for a walk or doing some gardening, anything that will get you moving.      Diet  Strive to eat healthy foods, including fruits and vegetables. Drink plenty of water. Avoid excessive sugar, caffeine, alcohol, and other mood-altering substances.     Stay Connected with Others  Stay in touch with friends and family members.    Manage Your Mood  Try deep breathing, massage therapy, biofeedback, or meditation. Take part in fun activities when you can. Try to find something to smile about each day.     Psychotherapy  Be open to working with a therapist if your provider recommends it.      Medication  Be sure to take your medication as prescribed. Most anti-depressants need to be taken every day. It usually takes several weeks for medications to work. Not all medicines work for all people. It is important to follow-up with your provider to make sure you have a treatment plan that is working for you. Do not stop your medication abruptly without first discussing it with your provider.    Crisis Resources   These hotlines are for both adults and children. They and are open 24 hours a day, 7 days a week unless noted otherwise.    National Suicide Prevention Lifeline   988 or 5-449-706-GEVK (4503)    Crisis Text Line    www.crisistextline.org  Text HOME to 107962 from anywhere in the [place], anytime, about any type of crisis. A live, trained crisis counselor will receive the text and respond quickly.    Ever Lifeline for LGBTQ Youth  A national crisis intervention and suicide lifeline for LGBTQ youth under 25. Provides a safe place to talk without judgement. Call 1-251.680.3436; text START to 902298 or visit www.thetrevorproject.org to talk to a trained counselor.    For [facility] crisis numbers, visit the [facility] website at:  https://[place].gov/dhs/people-we-serve/adults/health-care/mental-health/resources/crisis-contacts.jsp

## 2025-04-14 NOTE — PROGRESS NOTES
"  Assessment & Plan   Problem List Items Addressed This Visit    None  Visit Diagnoses       Screening for lead exposure    -  Primary    Relevant Orders    Lead Capillary    Benign essential hypertension        Prediabetes        Eczema, unspecified type        Relevant Medications    triamcinolone (KENALOG) 0.1 % external cream    Attention deficit hyperactivity disorder (ADHD), combined type        Relevant Medications    lisdexamfetamine (VYVANSE) 60 MG capsule    Other Relevant Orders    Adult Mental Health  Referral             Follow up 1 month ADHD  Appreciate psychiatry review  Change from 30mg ADDerall XR to 60mg Vyvanse     BMI  Estimated body mass index is 34.15 kg/m  as calculated from the following:    Height as of this encounter: 1.778 m (5' 10\").    Weight as of this encounter: 108 kg (238 lb).         Dony Martinez is a 29 year old, presenting for the following health issues:  Recheck Medication        4/14/2025    10:02 AM   Additional Questions   Roomed by Divya HATHAWAY CMA     History of Present Illness       Reason for visit:  Lead testing,med review change,eczema    He eats 0-1 servings of fruits and vegetables daily.He consumes 0 sweetened beverage(s) daily.He exercises with enough effort to increase his heart rate 20 to 29 minutes per day.  He exercises with enough effort to increase his heart rate 4 days per week. He is missing 3 dose(s) of medications per week.  He is not taking prescribed medications regularly due to remembering to take.      He took his girlfriend's 60mg Vyvanse for 3 days and liked that instead of the 30mg Adderall XR  Also exposed to lead when working on his house    Also, he has not drawn labs from last visit        Objective    /80 (BP Location: Right arm, Patient Position: Chair, Cuff Size: Adult Large)   Pulse 96   Temp 98.6  F (37  C) (Tympanic)   Resp 18   Ht 1.778 m (5' 10\")   Wt 108 kg (238 lb)   SpO2 100%   BMI 34.15 kg/m    Body mass " index is 34.15 kg/m .  Physical Exam   Gen NAD  Affect flat  RRR          Signed Electronically by: CHAR MALDONADO DO

## 2025-04-15 ENCOUNTER — LAB (OUTPATIENT)
Dept: LAB | Facility: CLINIC | Age: 30
End: 2025-04-15
Payer: COMMERCIAL

## 2025-04-15 DIAGNOSIS — Z77.011 EXPOSURE TO LEAD: Primary | ICD-10-CM

## 2025-04-15 LAB
EST. AVERAGE GLUCOSE BLD GHB EST-MCNC: 117 MG/DL
HBA1C MFR BLD: 5.7 % (ref 0–5.6)

## 2025-04-15 PROCEDURE — 36415 COLL VENOUS BLD VENIPUNCTURE: CPT | Performed by: FAMILY MEDICINE

## 2025-04-15 PROCEDURE — 99000 SPECIMEN HANDLING OFFICE-LAB: CPT

## 2025-04-15 PROCEDURE — 83655 ASSAY OF LEAD: CPT | Mod: 90

## 2025-04-15 PROCEDURE — 80048 BASIC METABOLIC PNL TOTAL CA: CPT | Performed by: FAMILY MEDICINE

## 2025-04-15 PROCEDURE — 83036 HEMOGLOBIN GLYCOSYLATED A1C: CPT | Performed by: FAMILY MEDICINE

## 2025-04-16 LAB
ANION GAP SERPL CALCULATED.3IONS-SCNC: 8 MMOL/L (ref 7–15)
BUN SERPL-MCNC: 15 MG/DL (ref 6–20)
CALCIUM SERPL-MCNC: 8.9 MG/DL (ref 8.8–10.4)
CHLORIDE SERPL-SCNC: 104 MMOL/L (ref 98–107)
CREAT SERPL-MCNC: 1.06 MG/DL (ref 0.67–1.17)
EGFRCR SERPLBLD CKD-EPI 2021: >90 ML/MIN/1.73M2
GLUCOSE SERPL-MCNC: 121 MG/DL (ref 70–99)
HCO3 SERPL-SCNC: 25 MMOL/L (ref 22–29)
POTASSIUM SERPL-SCNC: 4.2 MMOL/L (ref 3.4–5.3)
SODIUM SERPL-SCNC: 137 MMOL/L (ref 135–145)

## 2025-04-17 LAB — LEAD BLDV-MCNC: <2 UG/DL

## 2025-04-28 ENCOUNTER — OFFICE VISIT (OUTPATIENT)
Dept: FAMILY MEDICINE | Facility: CLINIC | Age: 30
End: 2025-04-28
Payer: COMMERCIAL

## 2025-04-28 ENCOUNTER — ANCILLARY PROCEDURE (OUTPATIENT)
Dept: GENERAL RADIOLOGY | Facility: CLINIC | Age: 30
End: 2025-04-28
Attending: FAMILY MEDICINE
Payer: COMMERCIAL

## 2025-04-28 VITALS
RESPIRATION RATE: 16 BRPM | BODY MASS INDEX: 33.93 KG/M2 | DIASTOLIC BLOOD PRESSURE: 87 MMHG | HEIGHT: 70 IN | TEMPERATURE: 98.1 F | WEIGHT: 237 LBS | HEART RATE: 81 BPM | SYSTOLIC BLOOD PRESSURE: 132 MMHG | OXYGEN SATURATION: 98 %

## 2025-04-28 DIAGNOSIS — M25.551 HIP PAIN, RIGHT: ICD-10-CM

## 2025-04-28 DIAGNOSIS — F90.2 ATTENTION DEFICIT HYPERACTIVITY DISORDER (ADHD), COMBINED TYPE: Primary | ICD-10-CM

## 2025-04-28 DIAGNOSIS — M79.641 PAIN IN BOTH HANDS: ICD-10-CM

## 2025-04-28 DIAGNOSIS — M79.642 PAIN IN BOTH HANDS: ICD-10-CM

## 2025-04-28 PROCEDURE — 73120 X-RAY EXAM OF HAND: CPT | Mod: TC | Performed by: RADIOLOGY

## 2025-04-28 PROCEDURE — 3079F DIAST BP 80-89 MM HG: CPT | Performed by: FAMILY MEDICINE

## 2025-04-28 PROCEDURE — 99214 OFFICE O/P EST MOD 30 MIN: CPT | Performed by: FAMILY MEDICINE

## 2025-04-28 PROCEDURE — 73502 X-RAY EXAM HIP UNI 2-3 VIEWS: CPT | Mod: TC | Performed by: RADIOLOGY

## 2025-04-28 PROCEDURE — 3075F SYST BP GE 130 - 139MM HG: CPT | Performed by: FAMILY MEDICINE

## 2025-04-28 RX ORDER — LISDEXAMFETAMINE DIMESYLATE 60 MG/1
60 CAPSULE ORAL DAILY
Qty: 30 CAPSULE | Refills: 0 | Status: CANCELLED | OUTPATIENT
Start: 2025-04-28

## 2025-04-28 RX ORDER — DEXTROAMPHETAMINE SACCHARATE, AMPHETAMINE ASPARTATE MONOHYDRATE, DEXTROAMPHETAMINE SULFATE AND AMPHETAMINE SULFATE 5; 5; 5; 5 MG/1; MG/1; MG/1; MG/1
20 CAPSULE, EXTENDED RELEASE ORAL 2 TIMES DAILY
Qty: 60 CAPSULE | Refills: 0 | Status: SHIPPED | OUTPATIENT
Start: 2025-04-28 | End: 2025-05-28

## 2025-04-28 RX ORDER — DEXTROAMPHETAMINE SACCHARATE, AMPHETAMINE ASPARTATE MONOHYDRATE, DEXTROAMPHETAMINE SULFATE AND AMPHETAMINE SULFATE 5; 5; 5; 5 MG/1; MG/1; MG/1; MG/1
20 CAPSULE, EXTENDED RELEASE ORAL 2 TIMES DAILY
Qty: 60 CAPSULE | Refills: 0 | Status: SHIPPED | OUTPATIENT
Start: 2025-05-28 | End: 2025-06-27

## 2025-04-28 RX ORDER — DEXTROAMPHETAMINE SACCHARATE, AMPHETAMINE ASPARTATE MONOHYDRATE, DEXTROAMPHETAMINE SULFATE AND AMPHETAMINE SULFATE 5; 5; 5; 5 MG/1; MG/1; MG/1; MG/1
20 CAPSULE, EXTENDED RELEASE ORAL 2 TIMES DAILY
Qty: 60 CAPSULE | Refills: 0 | Status: SHIPPED | OUTPATIENT
Start: 2025-06-27 | End: 2025-07-27

## 2025-04-28 NOTE — PROGRESS NOTES
"  Assessment & Plan   Problem List Items Addressed This Visit    None  Visit Diagnoses       Attention deficit hyperactivity disorder (ADHD), combined type    -  Primary    Relevant Medications    amphetamine-dextroamphetamine (ADDERALL XR) 20 MG 24 hr capsule    amphetamine-dextroamphetamine (ADDERALL XR) 20 MG 24 hr capsule (Start on 5/28/2025)    amphetamine-dextroamphetamine (ADDERALL XR) 20 MG 24 hr capsule (Start on 6/27/2025)    Pain in both hands        Relevant Orders    Orthopedic  Referral    Physical Therapy  Referral    XR Hands Bilateral PA View    Hip pain, right        Relevant Orders    Orthopedic  Referral    Physical Therapy  Referral    XR Hip Right 2-3 Views           Change from Vyvanse 60mg to 20mg Adderall XR BID  See orders for hands/hip  Follow up 3 months ADHD     BMI  Estimated body mass index is 34.01 kg/m  as calculated from the following:    Height as of this encounter: 1.778 m (5' 10\").    Weight as of this encounter: 107.5 kg (237 lb).         Dony Martinez is a 29 year old, presenting for the following health issues:  Recheck Medication, Musculoskeletal Problem (Right hip pain and pain in right thumb), and Blood Draw (Lead and A1c)        4/28/2025     9:48 AM   Additional Questions   Roomed by Divya HATHAWAY CMA     Musculoskeletal Problem    History of Present Illness       Reason for visit:  Hip thing i have got worse and lasts all day now .. since im here i can do med follow up for vyvanse i have mixed feelings about it lasts only 6 hours. i also have some hand tendon muscle thing thats never fully goes away ..may need mri im guessing ?    He eats 0-1 servings of fruits and vegetables daily.He consumes 0 sweetened beverage(s) daily.He exercises with enough effort to increase his heart rate 20 to 29 minutes per day.  He exercises with enough effort to increase his heart rate 4 days per week. He is missing 3 dose(s) of medications per week.  He " "is not taking prescribed medications regularly due to other.          Medication Followup of Vyvanse   Taking Medication as prescribed: yes  Side Effects:  Diarrhea   Medication Helping Symptoms:  Feels like the medication isn't lasting like it should    Concern - Right Hip Pain   Onset: 1 week  Description: most of the pain is in the front of the hip   Intensity: moderate  Progression of Symptoms:  worsening and constant  Accompanying Signs & Symptoms: sometimes goes up the back and down the leg  Previous history of similar problem: yes  Precipitating factors:        Worsened by: walking   Alleviating factors:        Improved by: nothing   Therapies tried and outcome: None  Concern - Right Thumb Pain  Onset: Since January 2025  Description: pain in thumb   Intensity: moderate  Progression of Symptoms:  worsening and intermittent  Accompanying Signs & Symptoms: none   Previous history of similar problem: yes  Precipitating factors:        Worsened by: using it   Alleviating factors:        Improved by: none  Therapies tried and outcome: None      Review of Systems  Constitutional, HEENT, cardiovascular, pulmonary, gi and gu systems are negative, except as otherwise noted.      Objective    /87 (BP Location: Right arm, Patient Position: Chair, Cuff Size: Adult Large)   Pulse 81   Temp 98.1  F (36.7  C) (Oral)   Resp 16   Ht 1.778 m (5' 10\")   Wt 107.5 kg (237 lb)   SpO2 98%   BMI 34.01 kg/m    Body mass index is 34.01 kg/m .  Physical Exam   Gen NAD  Flat  Pain with palpation of bilateral thumb MTP  Right hip restricted AROM in all planes            Signed Electronically by: CHAR MALDONADO DO    "

## 2025-04-29 ENCOUNTER — PATIENT OUTREACH (OUTPATIENT)
Dept: CARE COORDINATION | Facility: CLINIC | Age: 30
End: 2025-04-29
Payer: COMMERCIAL

## 2025-05-01 ENCOUNTER — PATIENT OUTREACH (OUTPATIENT)
Dept: CARE COORDINATION | Facility: CLINIC | Age: 30
End: 2025-05-01

## 2025-05-15 ENCOUNTER — OFFICE VISIT (OUTPATIENT)
Dept: FAMILY MEDICINE | Facility: CLINIC | Age: 30
End: 2025-05-15
Payer: COMMERCIAL

## 2025-05-15 VITALS
DIASTOLIC BLOOD PRESSURE: 95 MMHG | RESPIRATION RATE: 16 BRPM | WEIGHT: 246 LBS | HEIGHT: 70 IN | HEART RATE: 81 BPM | TEMPERATURE: 98.2 F | SYSTOLIC BLOOD PRESSURE: 137 MMHG | OXYGEN SATURATION: 98 % | BODY MASS INDEX: 35.22 KG/M2

## 2025-05-15 DIAGNOSIS — F90.2 ATTENTION DEFICIT HYPERACTIVITY DISORDER (ADHD), COMBINED TYPE: Primary | ICD-10-CM

## 2025-05-15 PROBLEM — R73.03 PREDIABETES: Status: ACTIVE | Noted: 2025-05-13

## 2025-05-15 RX ORDER — DEXTROAMPHETAMINE SACCHARATE, AMPHETAMINE ASPARTATE MONOHYDRATE, DEXTROAMPHETAMINE SULFATE AND AMPHETAMINE SULFATE 7.5; 7.5; 7.5; 7.5 MG/1; MG/1; MG/1; MG/1
30 CAPSULE, EXTENDED RELEASE ORAL 2 TIMES DAILY
Qty: 60 CAPSULE | Refills: 0 | Status: SHIPPED | OUTPATIENT
Start: 2025-05-15 | End: 2025-06-14

## 2025-05-15 ASSESSMENT — PATIENT HEALTH QUESTIONNAIRE - PHQ9
SUM OF ALL RESPONSES TO PHQ QUESTIONS 1-9: 6
SUM OF ALL RESPONSES TO PHQ QUESTIONS 1-9: 6

## 2025-05-15 NOTE — PROGRESS NOTES
"  Assessment & Plan   Problem List Items Addressed This Visit    None  Visit Diagnoses         Attention deficit hyperactivity disorder (ADHD), combined type    -  Primary    Relevant Medications    amphetamine-dextroamphetamine (ADDERALL XR) 30 MG 24 hr capsule           He has a psychiatry appointment scheduled with [facility] on July 8th    He has a therapy appointment with SEN[facility] on June 11.    In the meantime, I'll increase his dose of stimulants to 30mg BID XR adderall and follow up at 4 weeks       Nicotine/Tobacco Cessation  He reports that he has been smoking vaping device. He has been exposed to tobacco smoke. He has never used smokeless tobacco.  Nicotine/Tobacco Cessation Plan        BMI  Estimated body mass index is 35.3 kg/m  as calculated from the following:    Height as of this encounter: 1.778 m (5' 10\").    Weight as of this encounter: 111.6 kg (246 lb).         Dony Martinez is a 29 year old, presenting for the following health issues:  Recheck Medication        5/15/2025     6:49 AM   Additional Questions   Roomed by Divya HATHAWAY CMA     History of Present Illness       Reason for visit:  Med not working as anticipated, questions about psychiatry.    He eats 0-1 servings of fruits and vegetables daily.He consumes 0 sweetened beverage(s) daily.He exercises with enough effort to increase his heart rate 20 to 29 minutes per day.  He exercises with enough effort to increase his heart rate 4 days per week. He is missing 4 dose(s) of medications per week.  He is not taking prescribed medications regularly due to other.      He says the 20mg dose of Adderall is not adequate, would like to go up to 30mg BID, which he has used recreationally in the past but helped him significantly.    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  Physical Exam   Gen NAD  Flat affect, cognition normal, slow speech        Signed Electronically by: CHAR MALDONADO DO    "

## 2025-05-19 ENCOUNTER — TELEPHONE (OUTPATIENT)
Dept: FAMILY MEDICINE | Facility: CLINIC | Age: 30
End: 2025-05-19
Payer: COMMERCIAL

## 2025-05-19 DIAGNOSIS — M79.641 PAIN IN BOTH HANDS: Primary | ICD-10-CM

## 2025-05-19 DIAGNOSIS — M79.642 PAIN IN BOTH HANDS: Primary | ICD-10-CM

## 2025-05-19 NOTE — TELEPHONE ENCOUNTER
----- Message from Denise GONSALEZ sent at 5/19/2025  9:51 AM CDT -----  Regarding: OT Order  Hello, You entered an order for Physical Therapy with a diagnosis related to this patient's elbow to fingertips. This patient should see a hand therapist for this and our hand therapists are all Occupational Therapists. Please enter a new order for Occupational Therapy and select Hand Therapy under Specialty ServicesThank you, [facility] [phone]

## 2025-05-22 ENCOUNTER — THERAPY VISIT (OUTPATIENT)
Dept: PHYSICAL THERAPY | Facility: CLINIC | Age: 30
End: 2025-05-22
Attending: FAMILY MEDICINE
Payer: COMMERCIAL

## 2025-05-22 DIAGNOSIS — M79.641 PAIN IN BOTH HANDS: ICD-10-CM

## 2025-05-22 DIAGNOSIS — M79.642 PAIN IN BOTH HANDS: ICD-10-CM

## 2025-05-22 DIAGNOSIS — M25.551 HIP PAIN, RIGHT: ICD-10-CM

## 2025-05-22 ASSESSMENT — ACTIVITIES OF DAILY LIVING (ADL)
SPORTS_TOTAL_ITEM_SCORE: 0
HOW_WOULD_YOU_RATE_YOUR_CURRENT_LEVEL_OF_FUNCTION_DURING_YOUR_USUAL_ACTIVITIES_OF_DAILY_LIVING_FROM_0_TO_100_WITH_100_BEING_YOUR_LEVEL_OF_FUNCTION_PRIOR_TO_YOUR_HIP_PROBLEM_AND_0_BEING_THE_INABILITY_TO_PERFORM_ANY_OF_YOUR_USUAL_DAILY_ACTIVITIES?: 85
ADL_COUNT: 17
LIGHT_TO_MODERATE_WORK: NO DIFFICULTY AT ALL
SWINGING_OBJECTS_LIKE_A_GOLF_CLUB: NO DIFFICULTY AT ALL
WALKING_DOWN_STEEP_HILLS: NO DIFFICULTY AT ALL
WALKING_15_MINUTES_OR_GREATER: NO DIFFICULTY AT ALL
ROLLING_OVER_IN_BED: SLIGHT DIFFICULTY
TWISTING/PIVOTING_ON_INVOLVED_LEG: SLIGHT DIFFICULTY
DEEP SQUATTING: MODERATE DIFFICULTY
HOW_WOULD_YOU_RATE_YOUR_CURRENT_LEVEL_OF_FUNCTION?: NEARLY NORMAL
LOW_IMPACT_ACTIVITIES_LIKE_FAST_WALKING: NO DIFFICULTY AT ALL
RECREATIONAL_ACTIVITIES: NO DIFFICULTY AT ALL
ABILITY_TO_PARTICIPATE_IN_YOUR_DESIRED_SPORT_AS_LONG_AS_YOU_WOULD_LIKE: SLIGHT DIFFICULTY
GOING_UP_1_FLIGHT_OF_STAIRS: NO DIFFICULTY AT ALL
CUTTING/LATERAL_MOVEMENTS: SLIGHT DIFFICULTY
GETTING_INTO_AND_OUT_OF_AN_AVERAGE_CAR: SLIGHT DIFFICULTY
PUTTING ON SOCKS AND SHOES: NO DIFFICULTY AT ALL
DEEP_SQUATTING: MODERATE DIFFICULTY
GOING_DOWN_1_FLIGHT_OF_STAIRS: NO DIFFICULTY AT ALL
GETTING_INTO_AND_OUT_OF_A_BATHTUB: NO DIFFICULTY AT ALL
HOS_ADL_SCORE(%): 86.76
JUMPING: SLIGHT DIFFICULTY
ADL_TOTAL_ITEM_SCORE: 0
SPORTS_COUNT: 9
SITTING FOR 15 MINUTES: MODERATE DIFFICULTY
LANDING: NO DIFFICULTY AT ALL
GETTING_INTO_AND_OUT_OF_A_BATHTUB: NO DIFFICULTY AT ALL
STEPPING UP AND DOWN CURBS: SLIGHT DIFFICULTY
GETTING INTO AND OUT OF AN AVERAGE CAR: SLIGHT DIFFICULTY
RECREATIONAL ACTIVITIES: NO DIFFICULTY AT ALL
HOW_WOULD_YOU_RATE_YOUR_CURRENT_LEVEL_OF_FUNCTION_DURING_YOUR_USUAL_ACTIVITIES_OF_DAILY_LIVING_FROM_0_TO_100_WITH_100_BEING_YOUR_LEVEL_OF_FUNCTION_PRIOR_TO_YOUR_HIP_PROBLEM_AND_0_BEING_THE_INABILITY_TO_PERFORM_ANY_OF_YOUR_USUAL_DAILY_ACTIVITIES?: 85
WALKING_15_MINUTES_OR_GREATER: NO DIFFICULTY AT ALL
SITTING_FOR_15_MINUTES: MODERATE DIFFICULTY
ADL_HIGHEST_POTENTIAL_SCORE: 68
GOING DOWN 1 FLIGHT OF STAIRS: NO DIFFICULTY AT ALL
STEPPING_UP_AND_DOWN_CURBS: SLIGHT DIFFICULTY
STANDING FOR 15 MINUTES: SLIGHT DIFFICULTY
WALKING_APPROXIMATELY_10_MINUTES: NO DIFFICULTY AT ALL
TWISTING/PIVOTING ON INVOLVED LEG: SLIGHT DIFFICULTY
HOW_WOULD_YOU_RATE_YOUR_CURRENT_LEVEL_OF_FUNCTION_DURING_YOUR_SPORTS_RELATED_ACTIVITIES_FROM_0_TO_100_WITH_100_BEING_YOUR_LEVEL_OF_FUNCTION_PRIOR_TO_YOUR_HIP_PROBLEM_AND_0_BEING_THE_INABILITY_TO_PERFORM_ANY_OF_YOUR_USUAL_DAILY_ACTIVITIES?: 85
SPORTS_HIGHEST_POTENTIAL_SCORE: 36
WALKING_INITIALLY: NO DIFFICULTY AT ALL
WALKING_UP_STEEP_HILLS: NO DIFFICULTY AT ALL
WALKING_UP_STEEP_HILLS: NO DIFFICULTY AT ALL
STARTING_AND_STOPPING_QUICKLY: NO DIFFICULTY AT ALL
HOS_ADL_ITEM_SCORE_TOTAL: 59
STANDING_FOR_15_MINUTES: SLIGHT DIFFICULTY
ABILITY_TO_PERFORM_ACTIVITY_WITH_YOUR_NORMAL_TECHNIQUE: NO DIFFICULTY AT ALL
WALKING_FOR_APPROXIMATELY_10_MINUTES: NO DIFFICULTY AT ALL
SPORTS_SCORE(%): 0
PLEASE_INDICATE_YOR_PRIMARY_REASON_FOR_REFERRAL_TO_THERAPY:: HIP
WALKING_DOWN_STEEP_HILLS: NO DIFFICULTY AT ALL
ADL_SCORE(%): 0
WALKING_INITIALLY: NO DIFFICULTY AT ALL
GOING UP 1 FLIGHT OF STAIRS: NO DIFFICULTY AT ALL
PUTTING_ON_SOCKS_AND_SHOES: NO DIFFICULTY AT ALL
ROLLING OVER IN BED: SLIGHT DIFFICULTY
HEAVY_WORK: MODERATE DIFFICULTY
LIGHT_TO_MODERATE_WORK: NO DIFFICULTY AT ALL
RUNNING_ONE_MILE: SLIGHT DIFFICULTY
HEAVY_WORK: MODERATE DIFFICULTY
HOS_ADL_HIGHEST_POTENTIAL_SCORE: 68

## 2025-05-22 NOTE — PROGRESS NOTES
PHYSICAL THERAPY EVALUATION  Type of Visit: Evaluation       Fall Risk Screen:  Have you fallen 2 or more times in the past year?: No  Have you fallen and had an injury in the past year?: No    Subjective   Many years of Rt hip pain, lat/ant. Comes and goes, but mostly when I rise.  Can worsen then fade in frequency.       Presenting condition or subjective complaint: right hip cramps up intensely  Date of onset: 05/15/25    Relevant medical history:     Dates & types of surgery:      Prior diagnostic imaging/testing results: X-ray     Prior therapy history for the same diagnosis, illness or injury: No      Prior Level of Function  Transfers: Independent  Ambulation: Independent  ADL: Independent  IADL:     Living Environment  Social support: With a significant other or spouse   Type of home: House   Stairs to enter the home: Yes 2 Is there a railing: Yes     Ramp: No   Stairs inside the home: Yes 2 Is there a railing: Yes     Help at home:    Equipment owned:       Employment: Yes   Hobbies/Interests:      Patient goals for therapy: get up comfortably    Pain assessment: Location: ant lat Rt hip /Ratin/10      Objective   HIP EVALUATION  PAIN: Pain is Exacerbated By: squatting, rising   INTEGUMENTARY (edema, incisions):   POSTURE: WFL   GAIT:   Weightbearing Status:   Assistive Device(s):   Gait Deviations: antalgic   BALANCE/PROPRIOCEPTION: WFL  WEIGHTBEARING ALIGNMENT: WFL  Rt  crest elevated vs left   NON-WEIGHTBEARING ALIGNMENT:    ROM:     PELVIC/SI SCREEN: +Rt SI dysf, asymet   STRENGTH: WNL   LE FLEXIBILITY: Hip LOM   SPECIAL TESTS: +obers, neg ALINA  Rt   FUNCTIONAL TESTS:   PALPATION: gluts ++TTP   JOINT MOBILITY:     Assessment & Plan   CLINICAL IMPRESSIONS  Medical Diagnosis: hip pain    Treatment Diagnosis: hip pain   Impression/Assessment: Patient is a 29 year old male with Rt hip  complaints.  The following significant findings have been identified: Pain, Decreased ROM/flexibility,  Decreased joint mobility, Impaired muscle performance, and Decreased activity tolerance. These impairments interfere with their ability to perform self care tasks, work tasks, recreational activities, household chores, and community mobility as compared to previous level of function.   Pt presents w/ Rt hip pain, SI dysf, Itband syndr. He will benefit from a course of PT to improve his functional level.     Clinical Decision Making (Complexity):  Clinical Presentation: Stable/Uncomplicated  Clinical Presentation Rationale: based on medical and personal factors listed in PT evaluation  Clinical Decision Making (Complexity): Low complexity    PLAN OF CARE  Treatment Interventions:  Interventions: Manual Therapy, Neuromuscular Re-education, Therapeutic Exercise, Self-Care/Home Management    Long Term Goals     PT Goal 1  Goal Identifier: ambulation  Goal Description: ambulate +1 hr w/o pain for 1 month  Rationale: to maximize safety and independence with performance of ADLs and functional tasks;to maximize safety and independence within the home;to maximize safety and independence with self cares  Target Date: 07/17/25      Frequency of Treatment: q  2 weeks  Duration of Treatment: 8 weeks    Recommended Referrals to Other Professionals: Physical Therapy  Education Assessment:        Risks and benefits of evaluation/treatment have been explained.   Patient/Family/caregiver agrees with Plan of Care.     Evaluation Time:     PT Eval, Low Complexity Minutes (30123): 12       Signing Clinician: Anthony Ramos PT

## 2025-06-02 ENCOUNTER — TELEPHONE (OUTPATIENT)
Dept: FAMILY MEDICINE | Facility: CLINIC | Age: 30
End: 2025-06-02
Payer: COMMERCIAL

## 2025-06-02 NOTE — TELEPHONE ENCOUNTER
Patient Quality Outreach    Patient is due for the following:   Hypertension -  Hypertension follow-up visit    Action(s) Taken:   Patient has upcoming appointment, these items will be addressed at that time.    Type of outreach:    Appointment made while patient was in clinic.    Questions for provider review:    None         Divya Jones CMA  Chart routed to Care Team.

## 2025-06-11 ENCOUNTER — VIRTUAL VISIT (OUTPATIENT)
Dept: PSYCHOLOGY | Facility: CLINIC | Age: 30
End: 2025-06-11
Payer: COMMERCIAL

## 2025-06-11 DIAGNOSIS — F43.10 POSTTRAUMATIC STRESS DISORDER: Primary | ICD-10-CM

## 2025-06-11 NOTE — PROGRESS NOTES
Talked briefly with patient for scheduled session.  He reported not knowing that his intake appointment was today, and hadn't completed his intake paperwork and was unable to complete intake today, but stated interest in rescheduling intake appointment, which was done.    Teofilo Reynoso M.A., FT 6/11/25

## 2025-06-12 ENCOUNTER — OFFICE VISIT (OUTPATIENT)
Dept: FAMILY MEDICINE | Facility: CLINIC | Age: 30
End: 2025-06-12
Payer: COMMERCIAL

## 2025-06-12 VITALS
TEMPERATURE: 98.2 F | RESPIRATION RATE: 16 BRPM | BODY MASS INDEX: 34.36 KG/M2 | WEIGHT: 240 LBS | OXYGEN SATURATION: 99 % | HEART RATE: 90 BPM | SYSTOLIC BLOOD PRESSURE: 134 MMHG | HEIGHT: 70 IN | DIASTOLIC BLOOD PRESSURE: 84 MMHG

## 2025-06-12 DIAGNOSIS — F90.2 ATTENTION DEFICIT HYPERACTIVITY DISORDER (ADHD), COMBINED TYPE: ICD-10-CM

## 2025-06-12 DIAGNOSIS — H10.12 ALLERGIC CONJUNCTIVITIS, LEFT: Primary | ICD-10-CM

## 2025-06-12 RX ORDER — DEXTROAMPHETAMINE SACCHARATE, AMPHETAMINE ASPARTATE MONOHYDRATE, DEXTROAMPHETAMINE SULFATE AND AMPHETAMINE SULFATE 7.5; 7.5; 7.5; 7.5 MG/1; MG/1; MG/1; MG/1
30 CAPSULE, EXTENDED RELEASE ORAL 2 TIMES DAILY
Qty: 60 CAPSULE | Refills: 0 | Status: SHIPPED | OUTPATIENT
Start: 2025-06-12

## 2025-06-12 NOTE — PROGRESS NOTES
"  Assessment & Plan   Problem List Items Addressed This Visit    None  Visit Diagnoses         Allergic conjunctivitis, left    -  Primary      Attention deficit hyperactivity disorder (ADHD), combined type        Relevant Medications    amphetamine-dextroamphetamine (ADDERALL XR) 30 MG 24 hr capsule           Continue with 30mg BID adderall    He is unsure if he still has an appointment July 8th with [facility]  He missed his intake with psychology yesterday, so rescheduled for 8/25/25 with Floyd Reynoso    No corneal abrasion on dye exam today, advised warm compresses if he notices eyelid swelling, change contacts, use protective eyewear at work       BMI  Estimated body mass index is 34.44 kg/m  as calculated from the following:    Height as of this encounter: 1.778 m (5' 10\").    Weight as of this encounter: 108.9 kg (240 lb).       Dony Martinez is a 29 year old, presenting for the following health issues:  A.D.H.D and swollen Tear duct        6/12/2025     6:56 AM   Additional Questions   Roomed by Divya HATHAWAY CMA     A.D.H.D    History of Present Illness       Reason for visit:  Swollen tear duct / infection  Symptom onset:  3-7 days ago He is missing 3 dose(s) of medications per week.      Was not wearing protective eyewear at work, exposed to dusts, turned red, has resolved but he wonders what to do next. He did recently buy protective eyewear.    Left eyelid seemed swollen also, which he massaged and is better    Missed his intake with [facility] yesterday      Objective    /84 (BP Location: Right arm, Patient Position: Chair, Cuff Size: Adult Large)   Pulse 90   Temp 98.2  F (36.8  C) (Oral)   Resp 16   Ht 1.778 m (5' 10\")   Wt 108.9 kg (240 lb)   SpO2 99%   BMI 34.44 kg/m    Body mass index is 34.44 kg/m .  Physical Exam   Gen NAD  No corneal abrasion on dye exam  Flat affect            Signed Electronically by: CHAR MALDONADO, DO    "

## 2025-06-30 ENCOUNTER — TELEPHONE (OUTPATIENT)
Dept: FAMILY MEDICINE | Facility: CLINIC | Age: 30
End: 2025-06-30
Payer: COMMERCIAL

## 2025-06-30 NOTE — TELEPHONE ENCOUNTER
Forms/Letter Request    Type of form/letter: FMLA - Unknown     Do we have the form/letter: Yes: Will be sent via [employer]    Who is the form from? Patient    Where did/will the form come from? form was sent via [employer]    When is form/letter needed by: asap - needs physican's portion filled out.    How would you like the form/letter returned: [employer]    Patient Notified form requests are processed in 5-7 business days:Yes    Could we send this information to you in [employer] or would you prefer to receive a phone call?:   Patient would like to be contacted via [employer]

## 2025-06-30 NOTE — TELEPHONE ENCOUNTER
Sent a [employer] message for patient to resend the form via [employer].    ANDREY Banks  [facility]

## 2025-07-02 PROBLEM — M25.551 HIP PAIN, RIGHT: Status: RESOLVED | Noted: 2025-05-22 | Resolved: 2025-07-02

## 2025-07-07 ENCOUNTER — OFFICE VISIT (OUTPATIENT)
Dept: FAMILY MEDICINE | Facility: CLINIC | Age: 30
End: 2025-07-07
Payer: COMMERCIAL

## 2025-07-07 ENCOUNTER — PATIENT OUTREACH (OUTPATIENT)
Dept: CARE COORDINATION | Facility: CLINIC | Age: 30
End: 2025-07-07

## 2025-07-07 VITALS
HEIGHT: 70 IN | BODY MASS INDEX: 33.79 KG/M2 | WEIGHT: 236 LBS | TEMPERATURE: 97 F | DIASTOLIC BLOOD PRESSURE: 82 MMHG | OXYGEN SATURATION: 99 % | HEART RATE: 86 BPM | SYSTOLIC BLOOD PRESSURE: 128 MMHG | RESPIRATION RATE: 16 BRPM

## 2025-07-07 DIAGNOSIS — F90.2 ATTENTION DEFICIT HYPERACTIVITY DISORDER (ADHD), COMBINED TYPE: Primary | ICD-10-CM

## 2025-07-07 PROCEDURE — 3079F DIAST BP 80-89 MM HG: CPT | Performed by: FAMILY MEDICINE

## 2025-07-07 PROCEDURE — 99214 OFFICE O/P EST MOD 30 MIN: CPT | Performed by: FAMILY MEDICINE

## 2025-07-07 PROCEDURE — 3074F SYST BP LT 130 MM HG: CPT | Performed by: FAMILY MEDICINE

## 2025-07-07 RX ORDER — DEXTROAMPHETAMINE SACCHARATE, AMPHETAMINE ASPARTATE, DEXTROAMPHETAMINE SULFATE AND AMPHETAMINE SULFATE 7.5; 7.5; 7.5; 7.5 MG/1; MG/1; MG/1; MG/1
30 TABLET ORAL 2 TIMES DAILY
Qty: 60 TABLET | Refills: 0 | Status: SHIPPED | OUTPATIENT
Start: 2025-07-07 | End: 2025-09-05

## 2025-07-07 NOTE — PROGRESS NOTES
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    Weight as of this encounter: 107 kg (236 lb).       Dony Martinez is a 29 year old, presenting for the following health issues:  Recheck Medication        7/7/2025     9:26 AM   Additional Questions   Roomed by Divya HATHAWAY CMA     History of Present Illness       Reason for visit:  Med review i believe i dont rm if i made for any other reason    He eats 0-1 servings of fruits and vegetables daily.He consumes 1 sweetened beverage(s) daily.He exercises with enough effort to increase his heart rate 30 to 60 minutes per day.  He exercises with enough effort to increase his heart rate 7 days per week. He is missing 1 dose(s) of medications per week.  He is not taking prescribed medications regularly due to other.          Wants to try short acting adderall instead of XR  Baby making sleep harder      Objective    /82 (BP Location: Right arm, Patient Position: Chair, Cuff Size: Adult Large)   Pulse 86   Temp 97  F (36.1  C) (Tympanic)   Resp 16   Ht 1.778 m (5' 10\")   Wt 107 kg (236 lb)   SpO2 99%   BMI 33.86 kg/m    Body mass index is 33.86 kg/m .  Physical Exam   Gen NAD  Psych: baseline flat affect        Signed Electronically by: CHAR MALDONADO,     "

## 2025-07-21 ENCOUNTER — OFFICE VISIT (OUTPATIENT)
Dept: FAMILY MEDICINE | Facility: CLINIC | Age: 30
End: 2025-07-21
Payer: COMMERCIAL

## 2025-07-21 VITALS
HEIGHT: 70 IN | DIASTOLIC BLOOD PRESSURE: 86 MMHG | BODY MASS INDEX: 32.93 KG/M2 | WEIGHT: 230 LBS | RESPIRATION RATE: 16 BRPM | SYSTOLIC BLOOD PRESSURE: 125 MMHG | OXYGEN SATURATION: 100 % | HEART RATE: 76 BPM | TEMPERATURE: 98 F

## 2025-07-21 DIAGNOSIS — F90.2 ATTENTION DEFICIT HYPERACTIVITY DISORDER (ADHD), COMBINED TYPE: Primary | ICD-10-CM

## 2025-07-21 DIAGNOSIS — Z59.9 HOUSING PROBLEMS: ICD-10-CM

## 2025-07-21 PROCEDURE — 99214 OFFICE O/P EST MOD 30 MIN: CPT | Performed by: FAMILY MEDICINE

## 2025-07-21 PROCEDURE — 3074F SYST BP LT 130 MM HG: CPT | Performed by: FAMILY MEDICINE

## 2025-07-21 PROCEDURE — 3079F DIAST BP 80-89 MM HG: CPT | Performed by: FAMILY MEDICINE

## 2025-07-21 RX ORDER — DEXTROAMPHETAMINE SACCHARATE, AMPHETAMINE ASPARTATE, DEXTROAMPHETAMINE SULFATE AND AMPHETAMINE SULFATE 3.75; 3.75; 3.75; 3.75 MG/1; MG/1; MG/1; MG/1
15 TABLET ORAL 4 TIMES DAILY
Qty: 120 TABLET | Refills: 0 | Status: SHIPPED | OUTPATIENT
Start: 2025-07-21 | End: 2025-07-23

## 2025-07-21 SDOH — ECONOMIC STABILITY - INCOME SECURITY: PROBLEM RELATED TO HOUSING AND ECONOMIC CIRCUMSTANCES, UNSPECIFIED: Z59.9

## 2025-07-21 ASSESSMENT — PATIENT HEALTH QUESTIONNAIRE - PHQ9
10. IF YOU CHECKED OFF ANY PROBLEMS, HOW DIFFICULT HAVE THESE PROBLEMS MADE IT FOR YOU TO DO YOUR WORK, TAKE CARE OF THINGS AT HOME, OR GET ALONG WITH OTHER PEOPLE: VERY DIFFICULT
SUM OF ALL RESPONSES TO PHQ QUESTIONS 1-9: 11
SUM OF ALL RESPONSES TO PHQ QUESTIONS 1-9: 11

## 2025-07-21 NOTE — PROGRESS NOTES
"  Assessment & Plan   Problem List Items Addressed This Visit    None  Visit Diagnoses         Attention deficit hyperactivity disorder (ADHD), combined type    -  Primary    Relevant Medications    amphetamine-dextroamphetamine (ADDERALL) 15 MG tablet      Housing problems        Relevant Orders    Primary Care - Care Coordination Referral           He would like to talk to a  about housing, specifically because he feels there is too much mold in his duplex.    He does not like the taste of cutting 30mg Adderall and does not want to go to XR, so will trial 15mg 4x/day  Follow up 2 weeks    BMI  Estimated body mass index is 33 kg/m  as calculated from the following:    Height as of this encounter: 1.778 m (5' 10\").    Weight as of this encounter: 104.3 kg (230 lb).       Dony Martinez is a 29 year old, presenting for the following health issues:  Recheck Medication (2- 30mg of Adderall not working would like to change to 4-15mg dose Per Day )        7/21/2025     8:00 AM   Additional Questions   Roomed by Divya HATHAWAY CMA     History of Present Illness       Reason for visit:  Med review,other questions    He eats 0-1 servings of fruits and vegetables daily.He consumes 0 sweetened beverage(s) daily.He exercises with enough effort to increase his heart rate 60 or more minutes per day.  He exercises with enough effort to increase his heart rate 7 days per week. He is missing 4 dose(s) of medications per week.      Mold in the duplex      Objective    /86 (BP Location: Right arm, Patient Position: Chair, Cuff Size: Adult Large)   Pulse 76   Temp 98  F (36.7  C) (Oral)   Resp 16   Ht 1.778 m (5' 10\")   Wt 104.3 kg (230 lb)   SpO2 100%   BMI 33.00 kg/m    Body mass index is 33 kg/m .  Physical Exam   Gen Appears in NAD, but tired  Same flat affect, his baseline          Signed Electronically by: CHAR MALDONADO,     "

## 2025-07-22 ENCOUNTER — PATIENT OUTREACH (OUTPATIENT)
Dept: CARE COORDINATION | Facility: CLINIC | Age: 30
End: 2025-07-22

## 2025-07-22 NOTE — LETTER
[facility] CARE COORDINATION  [address]  [place] [address]    July 24, 2025    Juan Lay  [address] [place] [id number]      Dear Juan,    I am a clinic community health worker who works with CHAR MALDONADO DO with the [facility]. I have been trying to reach you recently to introduce Clinic Care Coordination. Below is a description of clinic care coordination and how we can further assist you.       The clinic care coordination team is made up of a registered nurse, , financial resource worker and community health worker who understand the health care system. The goal of clinic care coordination is to help you manage your health and improve access to the health care system. Our team works alongside your provider to assist you in determining your health and social needs. We can help you obtain health care and community resources, providing you with necessary information and education. We can work with you through any barriers and develop a care plan that helps coordinate and strengthen the communication between you and your care team.  Our services are voluntary and are offered without charge to you personally.    Please feel free to contact Rebecca at [phone] with any questions or concerns regarding care coordination and what we can offer.      We are focused on providing you with the highest-quality healthcare experience possible.    Sincerely,     FAWAD Rodriguez  [facility] Center  [facility]

## 2025-07-22 NOTE — PROGRESS NOTES
Clinic Care Coordination Contact  [facility]/Voicemail    Clinical Data: Care Coordinator Outreach    Outreach Documentation Number of Outreach Attempt   7/22/2025   3:19 PM 1       Unable to leave a message due to: Voicemail is not set up.      Plan: Care Coordinator will try to reach patient again in 1-2 business days.    TREVOR RodriguezW  [phone]  [facility]

## 2025-07-23 ENCOUNTER — TELEPHONE (OUTPATIENT)
Dept: FAMILY MEDICINE | Facility: CLINIC | Age: 30
End: 2025-07-23
Payer: COMMERCIAL

## 2025-07-23 ENCOUNTER — MYC MEDICAL ADVICE (OUTPATIENT)
Dept: FAMILY MEDICINE | Facility: CLINIC | Age: 30
End: 2025-07-23
Payer: COMMERCIAL

## 2025-07-23 DIAGNOSIS — F90.2 ATTENTION DEFICIT HYPERACTIVITY DISORDER (ADHD), COMBINED TYPE: ICD-10-CM

## 2025-07-23 RX ORDER — DEXTROAMPHETAMINE SACCHARATE, AMPHETAMINE ASPARTATE, DEXTROAMPHETAMINE SULFATE AND AMPHETAMINE SULFATE 7.5; 7.5; 7.5; 7.5 MG/1; MG/1; MG/1; MG/1
30 TABLET ORAL 2 TIMES DAILY
Qty: 60 TABLET | Refills: 0 | Status: SHIPPED | OUTPATIENT
Start: 2025-07-23

## 2025-07-23 NOTE — TELEPHONE ENCOUNTER
Prior Authorization Retail Medication Request    Medication/Dose: Adderall 15mg qid needs pa   Diagnosis and ICD code (if different than what is on RX):    New/renewal/insurance change PA/secondary ins. PA:  Previously Tried and Failed:  unknown  Rationale:      Insurance   Primary: [employer]  Insurance ID:  [id number]    Secondary (if applicable):  Insurance ID:      Pharmacy Information (if different than what is on RX)  Name:  [facility]  Phone:  [phone]  Fax:[phone]    Clinic Information  Preferred routing pool for dept communication:

## 2025-07-24 NOTE — PROGRESS NOTES
Clinic Care Coordination Contact  [facility]/Voicemail    Clinical Data: Care Coordinator Outreach    Outreach Documentation Number of Outreach Attempt   7/22/2025   3:19 PM 1   7/24/2025  10:56 AM 2       Unable to leave a message due to: the line ringing once and dropping      Plan: Care Coordinator will send care coordination introduction letter with care coordinator contact information and explanation of care coordination services via [employer]. Care Coordinator will do no further outreaches at this time.    TREVOR RodriguezW  [phone]  [facility] Care Resource [facility]

## 2025-08-12 ENCOUNTER — E-VISIT (OUTPATIENT)
Dept: FAMILY MEDICINE | Facility: CLINIC | Age: 30
End: 2025-08-12
Payer: COMMERCIAL

## 2025-08-12 DIAGNOSIS — F90.2 ATTENTION DEFICIT HYPERACTIVITY DISORDER (ADHD), COMBINED TYPE: ICD-10-CM

## 2025-08-12 RX ORDER — DEXTROAMPHETAMINE SACCHARATE, AMPHETAMINE ASPARTATE, DEXTROAMPHETAMINE SULFATE AND AMPHETAMINE SULFATE 3.75; 3.75; 3.75; 3.75 MG/1; MG/1; MG/1; MG/1
15 TABLET ORAL 2 TIMES DAILY
Qty: 60 TABLET | Refills: 0 | Status: SHIPPED | OUTPATIENT
Start: 2025-08-12

## 2025-08-12 RX ORDER — DEXTROAMPHETAMINE SACCHARATE, AMPHETAMINE ASPARTATE MONOHYDRATE, DEXTROAMPHETAMINE SULFATE AND AMPHETAMINE SULFATE 7.5; 7.5; 7.5; 7.5 MG/1; MG/1; MG/1; MG/1
30 CAPSULE, EXTENDED RELEASE ORAL DAILY
Qty: 30 CAPSULE | Refills: 0 | Status: SHIPPED | OUTPATIENT
Start: 2025-08-12 | End: 2025-08-12

## 2025-08-12 RX ORDER — DEXTROAMPHETAMINE SACCHARATE, AMPHETAMINE ASPARTATE MONOHYDRATE, DEXTROAMPHETAMINE SULFATE AND AMPHETAMINE SULFATE 7.5; 7.5; 7.5; 7.5 MG/1; MG/1; MG/1; MG/1
30 CAPSULE, EXTENDED RELEASE ORAL DAILY
Qty: 30 CAPSULE | Refills: 0 | Status: SHIPPED | OUTPATIENT
Start: 2025-08-12

## 2025-08-12 ASSESSMENT — ANXIETY QUESTIONNAIRES
3. WORRYING TOO MUCH ABOUT DIFFERENT THINGS: SEVERAL DAYS
8. IF YOU CHECKED OFF ANY PROBLEMS, HOW DIFFICULT HAVE THESE MADE IT FOR YOU TO DO YOUR WORK, TAKE CARE OF THINGS AT HOME, OR GET ALONG WITH OTHER PEOPLE?: SOMEWHAT DIFFICULT
GAD7 TOTAL SCORE: 5
4. TROUBLE RELAXING: SEVERAL DAYS
2. NOT BEING ABLE TO STOP OR CONTROL WORRYING: SEVERAL DAYS
GAD7 TOTAL SCORE: 5
7. FEELING AFRAID AS IF SOMETHING AWFUL MIGHT HAPPEN: NOT AT ALL
GAD7 TOTAL SCORE: 5
6. BECOMING EASILY ANNOYED OR IRRITABLE: SEVERAL DAYS
1. FEELING NERVOUS, ANXIOUS, OR ON EDGE: SEVERAL DAYS
7. FEELING AFRAID AS IF SOMETHING AWFUL MIGHT HAPPEN: NOT AT ALL
IF YOU CHECKED OFF ANY PROBLEMS ON THIS QUESTIONNAIRE, HOW DIFFICULT HAVE THESE PROBLEMS MADE IT FOR YOU TO DO YOUR WORK, TAKE CARE OF THINGS AT HOME, OR GET ALONG WITH OTHER PEOPLE: SOMEWHAT DIFFICULT
5. BEING SO RESTLESS THAT IT IS HARD TO SIT STILL: NOT AT ALL

## 2025-08-12 ASSESSMENT — PATIENT HEALTH QUESTIONNAIRE - PHQ9
SUM OF ALL RESPONSES TO PHQ QUESTIONS 1-9: 8
SUM OF ALL RESPONSES TO PHQ QUESTIONS 1-9: 8
10. IF YOU CHECKED OFF ANY PROBLEMS, HOW DIFFICULT HAVE THESE PROBLEMS MADE IT FOR YOU TO DO YOUR WORK, TAKE CARE OF THINGS AT HOME, OR GET ALONG WITH OTHER PEOPLE: SOMEWHAT DIFFICULT

## 2025-08-20 ENCOUNTER — PATIENT OUTREACH (OUTPATIENT)
Dept: CARE COORDINATION | Facility: CLINIC | Age: 30
End: 2025-08-20
Payer: COMMERCIAL

## 2025-08-28 ENCOUNTER — E-VISIT (OUTPATIENT)
Dept: FAMILY MEDICINE | Facility: CLINIC | Age: 30
End: 2025-08-28
Payer: COMMERCIAL

## 2025-08-28 DIAGNOSIS — F90.2 ATTENTION DEFICIT HYPERACTIVITY DISORDER (ADHD), COMBINED TYPE: Primary | ICD-10-CM

## 2025-08-28 RX ORDER — DEXTROAMPHETAMINE SACCHARATE, AMPHETAMINE ASPARTATE, DEXTROAMPHETAMINE SULFATE AND AMPHETAMINE SULFATE 5; 5; 5; 5 MG/1; MG/1; MG/1; MG/1
20 TABLET ORAL 3 TIMES DAILY
Qty: 90 TABLET | Refills: 0 | Status: SHIPPED | OUTPATIENT
Start: 2025-08-28 | End: 2025-09-27

## 2025-08-28 ASSESSMENT — PATIENT HEALTH QUESTIONNAIRE - PHQ9
10. IF YOU CHECKED OFF ANY PROBLEMS, HOW DIFFICULT HAVE THESE PROBLEMS MADE IT FOR YOU TO DO YOUR WORK, TAKE CARE OF THINGS AT HOME, OR GET ALONG WITH OTHER PEOPLE: SOMEWHAT DIFFICULT
SUM OF ALL RESPONSES TO PHQ QUESTIONS 1-9: 9
SUM OF ALL RESPONSES TO PHQ QUESTIONS 1-9: 9

## 2025-08-28 ASSESSMENT — ANXIETY QUESTIONNAIRES
6. BECOMING EASILY ANNOYED OR IRRITABLE: SEVERAL DAYS
8. IF YOU CHECKED OFF ANY PROBLEMS, HOW DIFFICULT HAVE THESE MADE IT FOR YOU TO DO YOUR WORK, TAKE CARE OF THINGS AT HOME, OR GET ALONG WITH OTHER PEOPLE?: SOMEWHAT DIFFICULT
7. FEELING AFRAID AS IF SOMETHING AWFUL MIGHT HAPPEN: SEVERAL DAYS
7. FEELING AFRAID AS IF SOMETHING AWFUL MIGHT HAPPEN: SEVERAL DAYS
5. BEING SO RESTLESS THAT IT IS HARD TO SIT STILL: SEVERAL DAYS
2. NOT BEING ABLE TO STOP OR CONTROL WORRYING: SEVERAL DAYS
IF YOU CHECKED OFF ANY PROBLEMS ON THIS QUESTIONNAIRE, HOW DIFFICULT HAVE THESE PROBLEMS MADE IT FOR YOU TO DO YOUR WORK, TAKE CARE OF THINGS AT HOME, OR GET ALONG WITH OTHER PEOPLE: SOMEWHAT DIFFICULT
3. WORRYING TOO MUCH ABOUT DIFFERENT THINGS: SEVERAL DAYS
GAD7 TOTAL SCORE: 7
1. FEELING NERVOUS, ANXIOUS, OR ON EDGE: SEVERAL DAYS
GAD7 TOTAL SCORE: 7
GAD7 TOTAL SCORE: 7
4. TROUBLE RELAXING: SEVERAL DAYS